# Patient Record
Sex: FEMALE | Race: WHITE | NOT HISPANIC OR LATINO | Employment: OTHER | URBAN - METROPOLITAN AREA
[De-identification: names, ages, dates, MRNs, and addresses within clinical notes are randomized per-mention and may not be internally consistent; named-entity substitution may affect disease eponyms.]

---

## 2017-07-27 ENCOUNTER — RADIANT APPOINTMENT (OUTPATIENT)
Dept: MAMMOGRAPHY | Facility: CLINIC | Age: 47
End: 2017-07-27
Payer: COMMERCIAL

## 2017-07-27 ENCOUNTER — OFFICE VISIT (OUTPATIENT)
Dept: OBGYN | Facility: CLINIC | Age: 47
End: 2017-07-27
Payer: COMMERCIAL

## 2017-07-27 VITALS
DIASTOLIC BLOOD PRESSURE: 78 MMHG | WEIGHT: 185 LBS | BODY MASS INDEX: 29.73 KG/M2 | SYSTOLIC BLOOD PRESSURE: 110 MMHG | HEIGHT: 66 IN

## 2017-07-27 DIAGNOSIS — Z01.419 ENCOUNTER FOR GYNECOLOGICAL EXAMINATION WITHOUT ABNORMAL FINDING: Primary | ICD-10-CM

## 2017-07-27 DIAGNOSIS — B00.9 HSV INFECTION: ICD-10-CM

## 2017-07-27 DIAGNOSIS — Z12.31 ENCOUNTER FOR SCREENING MAMMOGRAM FOR HIGH-RISK PATIENT: ICD-10-CM

## 2017-07-27 DIAGNOSIS — Z11.51 SCREENING FOR HUMAN PAPILLOMAVIRUS: ICD-10-CM

## 2017-07-27 PROCEDURE — 87624 HPV HI-RISK TYP POOLED RSLT: CPT | Performed by: NURSE PRACTITIONER

## 2017-07-27 PROCEDURE — 99396 PREV VISIT EST AGE 40-64: CPT | Performed by: NURSE PRACTITIONER

## 2017-07-27 PROCEDURE — G0202 SCR MAMMO BI INCL CAD: HCPCS | Mod: TC

## 2017-07-27 PROCEDURE — G0145 SCR C/V CYTO,THINLAYER,RESCR: HCPCS | Performed by: NURSE PRACTITIONER

## 2017-07-27 RX ORDER — ACYCLOVIR 400 MG/1
400 TABLET ORAL 2 TIMES DAILY
Qty: 30 TABLET | Refills: 3 | Status: SHIPPED | OUTPATIENT
Start: 2017-07-27 | End: 2018-12-06

## 2017-07-27 ASSESSMENT — PATIENT HEALTH QUESTIONNAIRE - PHQ9: 5. POOR APPETITE OR OVEREATING: NOT AT ALL

## 2017-07-27 ASSESSMENT — ANXIETY QUESTIONNAIRES
GAD7 TOTAL SCORE: 0
5. BEING SO RESTLESS THAT IT IS HARD TO SIT STILL: NOT AT ALL
6. BECOMING EASILY ANNOYED OR IRRITABLE: NOT AT ALL
3. WORRYING TOO MUCH ABOUT DIFFERENT THINGS: NOT AT ALL
IF YOU CHECKED OFF ANY PROBLEMS ON THIS QUESTIONNAIRE, HOW DIFFICULT HAVE THESE PROBLEMS MADE IT FOR YOU TO DO YOUR WORK, TAKE CARE OF THINGS AT HOME, OR GET ALONG WITH OTHER PEOPLE: NOT DIFFICULT AT ALL
7. FEELING AFRAID AS IF SOMETHING AWFUL MIGHT HAPPEN: NOT AT ALL
1. FEELING NERVOUS, ANXIOUS, OR ON EDGE: NOT AT ALL
2. NOT BEING ABLE TO STOP OR CONTROL WORRYING: NOT AT ALL

## 2017-07-27 NOTE — MR AVS SNAPSHOT
"              After Visit Summary   2017    Allyssa Jimenez    MRN: [id number]           Patient Information     Date Of Birth          1970        Visit Information        Provider Department      2017 11:30 AM Lidia King APRN CNP [facility] [place]        Today's Diagnoses     Encounter for gynecological examination without abnormal finding    -  1    HSV infection           Follow-ups after your visit        Follow-up notes from your care team     Return in about 1 year (around 2018).      Who to contact     If you have questions or need follow up information about today's clinic visit or your schedule please contact [facility] directly at [phone].  Normal or non-critical lab and imaging results will be communicated to you by MyChart, letter or phone within 4 business days after the clinic has received the results. If you do not hear from us within 7 days, please contact the clinic through MyChart or phone. If you have a critical or abnormal lab result, we will notify you by phone as soon as possible.  Submit refill requests through Eons or call your pharmacy and they will forward the refill request to us. Please allow 3 business days for your refill to be completed.          Additional Information About Your Visit        MyChart Information     Eons lets you send messages to your doctor, view your test results, renew your prescriptions, schedule appointments and more. To sign up, go to [email or web address] . Click on \"Log in\" on the left side of the screen, which will take you to the Welcome page. Then click on \"Sign up Now\" on the right side of the page.     You will be asked to enter the access code listed below, as well as some personal information. Please follow the directions to create your username and password.     Your access code is: [email or web address]  Expires: 10/25/2017 11:56 AM     Your access code will  in 90 days. If you " "need help or a new code, please call your [facility] or [phone].        Care EveryWhere ID     This is your Care EveryWhere ID. This could be used by other organizations to access your [place] medical records  [id number]        Your Vitals Were     Height BMI (Body Mass Index)                5' 5.75\" (1.67 m) 30.09 kg/m2           Blood Pressure from Last 3 Encounters:   07/27/17 110/78   06/29/16 112/62   01/20/16 112/64    Weight from Last 3 Encounters:   07/27/17 185 lb (83.9 kg)   06/29/16 185 lb (83.9 kg)   01/20/16 190 lb (86.2 kg)              We Performed the Following     Pap imaged thin layer screen reflex to HPV if ASCUS - recommended age 25 - 29 years          Today's Medication Changes          These changes are accurate as of: 7/27/17 11:56 AM.  If you have any questions, ask your nurse or doctor.               These medicines have changed or have updated prescriptions.        Dose/Directions    acyclovir 400 MG tablet   Commonly known as:  ZOVIRAX   This may have changed:  See the new instructions.   Used for:  HSV infection   Changed by:  Lidia King APRN CNP        Dose:  400 mg   Take 1 tablet (400 mg) by mouth 2 times daily For 5 days when needed during an outbreak   Quantity:  30 tablet   Refills:  3            Where to get your medicines      [facility] [place], [place] - [address] [facility]  [address]  [facility] [place] [address]     Phone:  [phone]     acyclovir 400 MG tablet                Primary Care Provider Office Phone # Fax #    Dolores Raphael PA-C [phone]       [facility] [address] [address]        Equal Access to Services     [facility] STEPHANIE AH: Colin Vargas, waaxda luqadaha, qaybta kaalmada adeegyabrittany, celena tyson. So [facility] [phone].    ATENCIÓN: Si habla español, tiene a heredia disposición servicios gratuitos de " asistencia lingüística. Amberly al [phone].    We comply with applicable federal civil rights laws and [place] laws. We do not discriminate on the basis of race, color, national origin, age, disability sex, sexual orientation or gender identity.            Thank you!     Thank you for choosing [facility] [place]  for your care. Our goal is always to provide you with excellent care. Hearing back from our patients is one way we can continue to improve our services. Please take a few minutes to complete the written survey that you may receive in the mail after your visit with us. Thank you!             Your Updated Medication List - Protect others around you: Learn how to safely use, store and throw away your medicines at www.disposemymeds.org.          This list is accurate as of: 7/27/17 11:56 AM.  Always use your most recent med list.                   Brand Name Dispense Instructions for use Diagnosis    acyclovir 400 MG tablet    ZOVIRAX    30 tablet    Take 1 tablet (400 mg) by mouth 2 times daily For 5 days when needed during an outbreak    HSV infection       Multi-vitamin Tabs tablet      Take 1 tablet by mouth daily        OMEGA-3 FISH OIL PO      Take 2 g by mouth daily        VITAMIN D3 PO      Take 2,000 Units by mouth daily        ZYRTEC-D PO      Take 1 tablet by mouth daily

## 2017-07-27 NOTE — LETTER
August 8, 2017    Allyssa POLLARD Aafedt  [address] [place] [address]    Dear Allyssa,  We are happy to inform you that your PAP smear result from 7/27/17 is normal.  We are now able to do a follow up test on PAP smears. The DNA test is for HPV (Human Papilloma Virus). Cervical cancer is closely linked with certain types of HPV. Your result showed no evidence of high risk HPV.  Therefore we recommend you return in 1 year for your next pap smear and HPV test.  You will still need to return to the clinic every year for an annual exam and other preventive tests.  Please contact the clinic at [phone] with any questions.  Sincerely,    Lidia King, LETTY CNP/rlm

## 2017-07-27 NOTE — PROGRESS NOTES
Allyssa is a 47 year old  female who presents for annual exam.     Besides routine health maintenance, she has no other health concerns today .    HPI:  The patient's PCP is Dolores Raphael PA-C. Pt here for her annual exam. She is feeling well. She has problems with her achilles tendon and is planning on having an injection soon. Reports hot flashes/night sweats. No vaginal bleeding.     She has a small rash on her upper right scapula that is itchy. She had poison ivy over .      GYNECOLOGIC HISTORY:    No LMP recorded. Patient has had an ablation.  Her current contraception method is: none.  She  reports that she has never smoked. She has never used smokeless tobacco.      Patient is sexually active.  STD testing offered?  Declined  Last PHQ-9 score on record =   PHQ-9 SCORE 2017   Total Score 0     Last GAD7 score on record =   ESTELLE-7 SCORE 2017   Total Score 0     Alcohol Score = 5    HEALTH MAINTENANCE:  Cholesterol: Done at PCP  Last Mammo: 16, Result: normal, Next Mammo: today   Pap: 16 WNL  Lab Results   Component Value Date    PAP NIL 2016    PAP NIL 2016    PAP ASC-US 2015      Colonoscopy:  N/A, Result: not applicable  Dexa: Never    Health maintenance updated:  yes    HISTORY:  Obstetric History       T0      L2     SAB1   TAB0   Ectopic0   Multiple0   Live Births0       # Outcome Date GA Lbr Alejandro/2nd Weight Sex Delivery Anes PTL Lv   3 TAB            2 Para            1 Para                   Patient Active Problem List   Diagnosis     Sprain and strain of other specified sites of hip and thigh     Late effect of sprain and strain without mention of tendon injury     Nonallopathic lesion of sacral region     Ankle pain     Edema     Hamstring muscle strain     Pain in shoulder     Shoulder impingement syndrome     Past Surgical History:   Procedure Laterality Date     BUNIONECTOMY  2012     GYN SURGERY  2012    Novasure ablation     SHOULDER  "SURGERY Bilateral 2015    bicep tear      Social History   Substance Use Topics     Smoking status: Never Smoker     Smokeless tobacco: Never Used     Alcohol use 2.5 oz/week     5 Standard drinks or equivalent per week      Problem (# of Occurrences) Relation (Name,Age of Onset)    DIABETES (1) Mother    Heart Surgery (1) Father            Current Outpatient Prescriptions   Medication Sig     acyclovir (ZOVIRAX) 400 MG tablet Take 1 tablet (400 mg) by mouth 2 times daily For 5 days when needed during an outbreak     Cetirizine-Pseudoephedrine (ZYRTEC-D PO) Take 1 tablet by mouth daily     multivitamin, therapeutic with minerals (MULTI-VITAMIN) TABS Take 1 tablet by mouth daily     Omega-3 Fatty Acids (OMEGA-3 FISH OIL PO) Take 2 g by mouth daily     Cholecalciferol (VITAMIN D3 PO) Take 2,000 Units by mouth daily     [DISCONTINUED] acyclovir (ZOVIRAX) 400 MG tablet TK 1 T PO  TID FOR 5 DAYS     No current facility-administered medications for this visit.      No Known Allergies    Past medical, surgical, social and family histories were reviewed and updated in EPIC.    ROS:   12 point review of systems negative other than symptoms noted below.  Skin: Rash    EXAM:  /78  Ht 5' 5.75\" (1.67 m)  Wt 185 lb (83.9 kg)  BMI 30.09 kg/m2   BMI: Body mass index is 30.09 kg/(m^2).    PHYSICAL EXAM:  Constitutional:  Appearance: Well nourished, well developed, alert, in no acute distress  Neck:  Lymph Nodes:  No lymphadenopathy present    Thyroid:  Gland size normal, nontender, no nodules or masses present  on palpation  Chest:  Respiratory Effort:  Breathing unlabored  Cardiovascular:    Heart: Auscultation:  Regular rate, normal rhythm, no murmurs present  Breasts: Inspection of Breasts:  No lymphadenopathy present    Palpation of Breasts and Axillae:  No masses present on palpation, no  breast tenderness    Axillary Lymph Nodes:  No lymphadenopathy present  Gastrointestinal:   Abdominal Examination:  Abdomen " nontender to palpation, tone normal without rigidity or guarding, no masses present, umbilicus without lesions   Liver and Spleen:  No hepatomegaly present, liver nontender to palpation    Hernias:  No hernias present  Lymphatic: Lymph Nodes:  No other lymphadenopathy present  Skin:  General Inspection:  No rashes present, no lesions present, no areas of  discoloration    Genitalia and Groin:  No rashes present, no lesions present, no areas of  discoloration, no masses present  Neurologic/Psychiatric:    Mental Status:  Oriented X3     Pelvic Exam:  External Genitalia:     Normal appearance for age, no discharge present, no tenderness present, no inflammatory lesions present, color normal  Vagina:     Normal vaginal vault without central or paravaginal defects, no discharge present, no inflammatory lesions present, no masses present  Bladder:     Nontender to palpation  Urethra:   Urethral Body:  Urethra palpation normal, urethra structural support normal   Urethral Meatus:  No erythema or lesions present  Cervix:     Appearance healthy, no lesions present, nontender to palpation, no bleeding present  Uterus:     Uterus: firm, normal sized and nontender, anteverted in position.   Adnexa:     No adnexal tenderness present, no adnexal masses present  Perineum:     Perineum within normal limits, no evidence of trauma, no rashes or skin lesions present  Anus:     Anus within normal limits, no hemorrhoids present  Inguinal Lymph Nodes:     No lymphadenopathy present  Pubic Hair:     Normal pubic hair distribution for age  Genitalia and Groin:     No rashes present, no lesions present, no areas of discoloration, no masses present      COUNSELING:   Special attention given to:        Regular exercise       Healthy diet/nutrition       (Ieleen)menopause management    BMI: Body mass index is 30.09 kg/(m^2).  Weight management plan: Discussed healthy diet and exercise guidelines and patient will follow up in 12 months in clinic  to re-evaluate.    ASSESSMENT:  47 year old female with satisfactory annual exam.    ICD-10-CM    1. Encounter for gynecological examination without abnormal finding Z01.419 Pap imaged thin layer screen reflex to HPV if ASCUS - recommended age 25 - 29 years   2. HSV infection B00.9 acyclovir (ZOVIRAX) 400 MG tablet       PLAN:  Healthy, normal gyn exam. No concerns. Mammogram today. Refill acylovir.    LETTY Diego CNP

## 2017-07-28 ASSESSMENT — ANXIETY QUESTIONNAIRES: GAD7 TOTAL SCORE: 0

## 2017-07-28 ASSESSMENT — PATIENT HEALTH QUESTIONNAIRE - PHQ9: SUM OF ALL RESPONSES TO PHQ QUESTIONS 1-9: 0

## 2017-07-31 LAB
COPATH REPORT: NORMAL
PAP: NORMAL

## 2017-08-04 LAB
FINAL DIAGNOSIS: NORMAL
HPV HR 12 DNA CVX QL NAA+PROBE: NEGATIVE
HPV16 DNA SPEC QL NAA+PROBE: NEGATIVE
HPV18 DNA SPEC QL NAA+PROBE: NEGATIVE
SPECIMEN DESCRIPTION: NORMAL

## 2018-12-06 ENCOUNTER — RADIANT APPOINTMENT (OUTPATIENT)
Dept: MAMMOGRAPHY | Facility: CLINIC | Age: 48
End: 2018-12-06
Attending: NURSE PRACTITIONER
Payer: COMMERCIAL

## 2018-12-06 ENCOUNTER — OFFICE VISIT (OUTPATIENT)
Dept: OBGYN | Facility: CLINIC | Age: 48
End: 2018-12-06
Attending: NURSE PRACTITIONER
Payer: COMMERCIAL

## 2018-12-06 VITALS
BODY MASS INDEX: 30.79 KG/M2 | HEART RATE: 102 BPM | WEIGHT: 191.6 LBS | SYSTOLIC BLOOD PRESSURE: 112 MMHG | DIASTOLIC BLOOD PRESSURE: 72 MMHG | HEIGHT: 66 IN

## 2018-12-06 DIAGNOSIS — Z12.31 VISIT FOR SCREENING MAMMOGRAM: ICD-10-CM

## 2018-12-06 DIAGNOSIS — Z01.419 ENCOUNTER FOR GYNECOLOGICAL EXAMINATION WITHOUT ABNORMAL FINDING: Primary | ICD-10-CM

## 2018-12-06 DIAGNOSIS — B00.9 HSV INFECTION: ICD-10-CM

## 2018-12-06 PROCEDURE — 99396 PREV VISIT EST AGE 40-64: CPT | Performed by: NURSE PRACTITIONER

## 2018-12-06 PROCEDURE — 77067 SCR MAMMO BI INCL CAD: CPT | Mod: TC

## 2018-12-06 RX ORDER — LISINOPRIL 10 MG/1
10 TABLET ORAL
COMMUNITY
Start: 2018-11-29 | End: 2020-12-16

## 2018-12-06 RX ORDER — LORAZEPAM 0.5 MG/1
0.5 TABLET ORAL
COMMUNITY
Start: 2018-11-29 | End: 2020-12-16

## 2018-12-06 RX ORDER — NICOTINE POLACRILEX 4 MG/1
20 GUM, CHEWING ORAL
COMMUNITY
Start: 2018-11-29 | End: 2020-12-16

## 2018-12-06 RX ORDER — ACYCLOVIR 400 MG/1
400 TABLET ORAL 2 TIMES DAILY
Qty: 30 TABLET | Refills: 3 | Status: SHIPPED | OUTPATIENT
Start: 2018-12-06 | End: 2020-03-02

## 2018-12-06 ASSESSMENT — ANXIETY QUESTIONNAIRES
3. WORRYING TOO MUCH ABOUT DIFFERENT THINGS: NOT AT ALL
1. FEELING NERVOUS, ANXIOUS, OR ON EDGE: SEVERAL DAYS
IF YOU CHECKED OFF ANY PROBLEMS ON THIS QUESTIONNAIRE, HOW DIFFICULT HAVE THESE PROBLEMS MADE IT FOR YOU TO DO YOUR WORK, TAKE CARE OF THINGS AT HOME, OR GET ALONG WITH OTHER PEOPLE: NOT DIFFICULT AT ALL
5. BEING SO RESTLESS THAT IT IS HARD TO SIT STILL: NOT AT ALL
2. NOT BEING ABLE TO STOP OR CONTROL WORRYING: NOT AT ALL
6. BECOMING EASILY ANNOYED OR IRRITABLE: NOT AT ALL
7. FEELING AFRAID AS IF SOMETHING AWFUL MIGHT HAPPEN: NOT AT ALL
GAD7 TOTAL SCORE: 1

## 2018-12-06 ASSESSMENT — PATIENT HEALTH QUESTIONNAIRE - PHQ9
SUM OF ALL RESPONSES TO PHQ QUESTIONS 1-9: 0
5. POOR APPETITE OR OVEREATING: NOT AT ALL

## 2018-12-06 NOTE — MR AVS SNAPSHOT
"              After Visit Summary   2018    Allyssa Jimenez    MRN: [id number]           Patient Information     Date Of Birth          1970        Visit Information        Provider Department      2018 10:00 AM Lidia King APRN CNP [facility] [place]        Today's Diagnoses     Encounter for gynecological examination without abnormal finding    -  1    HSV infection           Follow-ups after your visit        Follow-up notes from your care team     Return in about 1 year (around 2019).      Who to contact     If you have questions or need follow up information about today's clinic visit or your schedule please contact [facility] directly at [phone].  Normal or non-critical lab and imaging results will be communicated to you by MyChart, letter or phone within 4 business days after the clinic has received the results. If you do not hear from us within 7 days, please contact the clinic through MyChart or phone. If you have a critical or abnormal lab result, we will notify you by phone as soon as possible.  Submit refill requests through Savorfull or call your pharmacy and they will forward the refill request to us. Please allow 3 business days for your refill to be completed.          Additional Information About Your Visit        MyChart Information     Savorfull lets you send messages to your doctor, view your test results, renew your prescriptions, schedule appointments and more. To sign up, go to [email or web address] . Click on \"Log in\" on the left side of the screen, which will take you to the Welcome page. Then click on \"Sign up Now\" on the right side of the page.     You will be asked to enter the access code listed below, as well as some personal information. Please follow the directions to create your username and password.     Your access code is: [email or web address]  Expires: 3/6/2019 10:07 AM     Your access code will  in 90 days. If you need " "help or a new code, please call your [place] clinic or [phone].        Care EveryWhere ID     This is your Care EveryWhere ID. This could be used by other organizations to access your [place] medical records  [id number]        Your Vitals Were     Pulse Height BMI (Body Mass Index)             102 5' 5.75\" (1.67 m) 31.16 kg/m2          Blood Pressure from Last 3 Encounters:   12/06/18 112/72   07/27/17 110/78   06/29/16 112/62    Weight from Last 3 Encounters:   12/06/18 191 lb 9.6 oz (86.9 kg)   07/27/17 185 lb (83.9 kg)   06/29/16 185 lb (83.9 kg)              Today, you had the following     No orders found for display         Where to get your medicines      These medications were sent to [facility] [id number]  [address] AT [facility] [address]  [place] [address]     Phone:  [phone]     acyclovir 400 MG tablet          Primary Care Provider Office Phone # Fax #    Dolores Raphael PA-C [phone]       [facility] [address] [address]        Equal Access to Services     AMARA BROWN AH: Hadii aad ku hadasho Soomaali, waaxda luqadaha, qaybta kaalmada adeegyada, celena zamorain hayaurora keys . So [facility] [phone].    ATENCIÓN: Si habla español, tiene a heredia disposición servicios gratuitos de asistencia lingüística. Llame al [phone].    We comply with applicable federal civil rights laws and [place] laws. We do not discriminate on the basis of race, color, national origin, age, disability, sex, sexual orientation, or gender identity.            Thank you!     Thank you for choosing [facility] [place]  for your care. Our goal is always to provide you with excellent care. Hearing back from our patients is one way we can continue to improve our services. Please take a few minutes to complete the written survey that you may receive in the mail after your visit with us. Thank you!             Your " Updated Medication List - Protect others around you: Learn how to safely use, store and throw away your medicines at www.disposemymeds.org.          This list is accurate as of 12/6/18 11:03 AM.  Always use your most recent med list.                   Brand Name Dispense Instructions for use Diagnosis    acyclovir 400 MG tablet    ZOVIRAX    30 tablet    Take 1 tablet (400 mg) by mouth 2 times daily For 5 days when needed during an outbreak    HSV infection       lisinopril 10 MG tablet    PRINIVIL/ZESTRIL     Take 10 mg by mouth        LORazepam 0.5 MG tablet    ATIVAN     Take 0.5 mg by mouth        Multi-vitamin tablet      Take 1 tablet by mouth daily        OMEGA-3 FISH OIL PO      Take 2 g by mouth daily        omeprazole 20 MG tablet      Take 20 mg by mouth        VITAMIN D3 PO      Take 2,000 Units by mouth daily        ZYRTEC-D PO      Take 1 tablet by mouth daily

## 2018-12-06 NOTE — PROGRESS NOTES
"  Allyssa is a 48 year old  female who presents for annual exam.     Besides routine health maintenance, she has no other health concerns today .    HPI:  The patient's PCP is Dolores Raphael PA-C.  Pt here today for her annual exam and mammogram. She is feeling much better after an \"episode\" of nausea and anxiety this past summer. She was dx with hypertension and reflux. She is now stable on lisinopril and is weaning off omeprazole. She does have lorazepam PRN but has only needed it twice.     She denies any vaginal bleeding. S/p ablation. She does have menopausal symptoms.     NIL pap in 2017 following a 2015 ASCUS pap.       GYNECOLOGIC HISTORY:    No LMP recorded. Patient has had an ablation.  Her current contraception method is: none.  She  reports that she has never smoked. She has never used smokeless tobacco.    Patient is sexually active.  STD testing offered?  Declined  Last PHQ-9 score on record =   PHQ-9 SCORE 2018   PHQ-9 Total Score 0     Last GAD7 score on record =   ESTELLE-7 SCORE 2018   Total Score 1     Alcohol Score = 4    HEALTH MAINTENANCE:  Cholesterol: (No results found for: CHOL patient has labs done with pcp  Last Mammo: 17, Result: normal, Next Mammo: today   Pap: 17 neg, HPV-  Colonoscopy: NA, due at age 51  Dexa: Never    Health maintenance updated:  yes    HISTORY:  Obstetric History       T0      L2     SAB0   TAB1   Ectopic0   Multiple0   Live Births0       # Outcome Date GA Lbr Alejandro/2nd Weight Sex Delivery Anes PTL Lv   3 TAB            2 Para            1 Para                   Patient Active Problem List   Diagnosis     Sprain and strain of other specified sites of hip and thigh     Late effect of sprain and strain without mention of tendon injury     Nonallopathic lesion of sacral region     Ankle pain     Edema     Hamstring muscle strain     Pain in shoulder     Shoulder impingement syndrome     ASCUS of cervix with negative high risk HPV     Past " "Surgical History:   Procedure Laterality Date     BUNIONECTOMY  2012     GYN SURGERY  2012    Novasure ablation     SHOULDER SURGERY Bilateral 2015    bicep tear      Social History   Substance Use Topics     Smoking status: Never Smoker     Smokeless tobacco: Never Used     Alcohol use 2.5 oz/week     5 Standard drinks or equivalent per week      Problem (# of Occurrences) Relation (Name,Age of Onset)    Diabetes (1) Mother    Heart Surgery (1) Father            Current Outpatient Prescriptions   Medication Sig     acyclovir (ZOVIRAX) 400 MG tablet Take 1 tablet (400 mg) by mouth 2 times daily For 5 days when needed during an outbreak     Cetirizine-Pseudoephedrine (ZYRTEC-D PO) Take 1 tablet by mouth daily     Cholecalciferol (VITAMIN D3 PO) Take 2,000 Units by mouth daily     lisinopril (PRINIVIL/ZESTRIL) 10 MG tablet Take 10 mg by mouth     LORazepam (ATIVAN) 0.5 MG tablet Take 0.5 mg by mouth     multivitamin, therapeutic with minerals (MULTI-VITAMIN) TABS Take 1 tablet by mouth daily     Omega-3 Fatty Acids (OMEGA-3 FISH OIL PO) Take 2 g by mouth daily     omeprazole 20 MG tablet Take 20 mg by mouth     [DISCONTINUED] acyclovir (ZOVIRAX) 400 MG tablet Take 1 tablet (400 mg) by mouth 2 times daily For 5 days when needed during an outbreak     No current facility-administered medications for this visit.      No Known Allergies    Past medical, surgical, social and family histories were reviewed and updated in EPIC.    ROS:   12 point review of systems negative other than symptoms noted below.  Constitutional: Weight Gain  Musculoskeletal: Joint Pain  Psychiatric: Anxiety    EXAM:  /72  Pulse 102  Ht 5' 5.75\" (1.67 m)  Wt 191 lb 9.6 oz (86.9 kg)  BMI 31.16 kg/m2   BMI: Body mass index is 31.16 kg/(m^2).    PHYSICAL EXAM:  Constitutional:  Appearance: Well nourished, well developed, alert, in no acute distress  Neck:  Lymph Nodes:  No lymphadenopathy present    Thyroid:  Gland size normal, nontender, no " nodules or masses present  on palpation  Chest:  Respiratory Effort:  Breathing unlabored  Cardiovascular:    Heart: Auscultation:  Regular rate, normal rhythm, no murmurs present  Breasts: Inspection of Breasts:  No lymphadenopathy present., Palpation of Breasts and Axillae:  No masses present on palpation, no breast tenderness., Axillary Lymph Nodes:  No lymphadenopathy present. and No nodularity, asymmetry or nipple discharge bilaterally.  Gastrointestinal:   Abdominal Examination:  Abdomen nontender to palpation, tone normal without rigidity or guarding, no masses present, umbilicus without lesions   Liver and Spleen:  No hepatomegaly present, liver nontender to palpation    Hernias:  No hernias present  Lymphatic: Lymph Nodes:  No other lymphadenopathy present  Skin:  General Inspection:  No rashes present, no lesions present, no areas of  discoloration    Genitalia and Groin:  No rashes present, no lesions present, no areas of  discoloration, no masses present  Neurologic/Psychiatric:    Mental Status:  Oriented X3     Pelvic Exam:  External Genitalia:     Normal appearance for age, no discharge present, no tenderness present, no inflammatory lesions present, color normal  Vagina:     Normal vaginal vault without central or paravaginal defects, no discharge present, no inflammatory lesions present, no masses present  Bladder:     Nontender to palpation  Urethra:   Urethral Body:  Urethra palpation normal, urethra structural support normal   Urethral Meatus:  No erythema or lesions present  Cervix:     Appearance healthy, no lesions present, nontender to palpation, no bleeding present  Uterus:     Uterus: firm, normal sized and nontender, anteverted in position.   Adnexa:     No adnexal tenderness present, no adnexal masses present  Perineum:     Perineum within normal limits, no evidence of trauma, no rashes or skin lesions present  Anus:     Anus within normal limits, no hemorrhoids present  Inguinal Lymph  Nodes:     No lymphadenopathy present  Pubic Hair:     Normal pubic hair distribution for age  Genitalia and Groin:     No rashes present, no lesions present, no areas of discoloration, no masses present      COUNSELING:   Special attention given to:        Regular exercise       Healthy diet/nutrition       Colon cancer screening       (Eileen)menopause management    BMI: Body mass index is 31.16 kg/(m^2).  Weight management plan: Discussed healthy diet and exercise guidelines    ASSESSMENT:  48 year old female with satisfactory annual exam.    ICD-10-CM    1. Encounter for gynecological examination without abnormal finding Z01.419    2. HSV infection B00.9 acyclovir (ZOVIRAX) 400 MG tablet       PLAN:  Overweight female with normal gyn exam. Pap guidelines discussed. Will pap every 3 years. No pap collected today. Refill of acyclovir if she needs it. Fasting labs per her primary.     LETTY Diego CNP

## 2018-12-07 ASSESSMENT — ANXIETY QUESTIONNAIRES: GAD7 TOTAL SCORE: 1

## 2019-03-13 ENCOUNTER — APPOINTMENT (OUTPATIENT)
Dept: GENERAL RADIOLOGY | Facility: CLINIC | Age: 49
End: 2019-03-13
Attending: EMERGENCY MEDICINE
Payer: COMMERCIAL

## 2019-03-13 ENCOUNTER — HOSPITAL ENCOUNTER (EMERGENCY)
Facility: CLINIC | Age: 49
Discharge: HOME | End: 2019-03-13
Attending: EMERGENCY MEDICINE | Admitting: EMERGENCY MEDICINE
Payer: COMMERCIAL

## 2019-03-13 VITALS
DIASTOLIC BLOOD PRESSURE: 80 MMHG | WEIGHT: 175 LBS | OXYGEN SATURATION: 98 % | SYSTOLIC BLOOD PRESSURE: 111 MMHG | BODY MASS INDEX: 28.12 KG/M2 | HEIGHT: 66 IN | RESPIRATION RATE: 18 BRPM | TEMPERATURE: 98.5 F | HEART RATE: 86 BPM

## 2019-03-13 DIAGNOSIS — R07.89 ATYPICAL CHEST PAIN: ICD-10-CM

## 2019-03-13 DIAGNOSIS — K21.00 GASTROESOPHAGEAL REFLUX DISEASE WITH ESOPHAGITIS: ICD-10-CM

## 2019-03-13 LAB
ANION GAP SERPL CALCULATED.3IONS-SCNC: 9 MMOL/L (ref 3–14)
BASOPHILS # BLD AUTO: 0 10E9/L (ref 0–0.2)
BASOPHILS NFR BLD AUTO: 0.6 %
BUN SERPL-MCNC: 10 MG/DL (ref 7–30)
CALCIUM SERPL-MCNC: 9.3 MG/DL (ref 8.5–10.1)
CHLORIDE SERPL-SCNC: 104 MMOL/L (ref 94–109)
CO2 SERPL-SCNC: 24 MMOL/L (ref 20–32)
CREAT SERPL-MCNC: 0.7 MG/DL (ref 0.52–1.04)
DIFFERENTIAL METHOD BLD: ABNORMAL
EOSINOPHIL # BLD AUTO: 0.1 10E9/L (ref 0–0.7)
EOSINOPHIL NFR BLD AUTO: 2.1 %
ERYTHROCYTE [DISTWIDTH] IN BLOOD BY AUTOMATED COUNT: 12.4 % (ref 10–15)
GFR SERPL CREATININE-BSD FRML MDRD: >90 ML/MIN/{1.73_M2}
GLUCOSE SERPL-MCNC: 105 MG/DL (ref 70–99)
HCT VFR BLD AUTO: 41.2 % (ref 35–47)
HGB BLD-MCNC: 14.4 G/DL (ref 11.7–15.7)
IMM GRANULOCYTES # BLD: 0 10E9/L (ref 0–0.4)
IMM GRANULOCYTES NFR BLD: 0.4 %
INTERPRETATION ECG - MUSE: NORMAL
LYMPHOCYTES # BLD AUTO: 1.3 10E9/L (ref 0.8–5.3)
LYMPHOCYTES NFR BLD AUTO: 26.1 %
MCH RBC QN AUTO: 33.3 PG (ref 26.5–33)
MCHC RBC AUTO-ENTMCNC: 35 G/DL (ref 31.5–36.5)
MCV RBC AUTO: 95 FL (ref 78–100)
MONOCYTES # BLD AUTO: 0.7 10E9/L (ref 0–1.3)
MONOCYTES NFR BLD AUTO: 13.8 %
NEUTROPHILS # BLD AUTO: 2.8 10E9/L (ref 1.6–8.3)
NEUTROPHILS NFR BLD AUTO: 57 %
NRBC # BLD AUTO: 0 10*3/UL
NRBC BLD AUTO-RTO: 0 /100
PLATELET # BLD AUTO: 248 10E9/L (ref 150–450)
POTASSIUM SERPL-SCNC: 4 MMOL/L (ref 3.4–5.3)
RBC # BLD AUTO: 4.33 10E12/L (ref 3.8–5.2)
SODIUM SERPL-SCNC: 137 MMOL/L (ref 133–144)
TROPONIN I SERPL-MCNC: <0.015 UG/L (ref 0–0.04)
TROPONIN I SERPL-MCNC: <0.015 UG/L (ref 0–0.04)
WBC # BLD AUTO: 4.9 10E9/L (ref 4–11)

## 2019-03-13 PROCEDURE — 25000132 ZZH RX MED GY IP 250 OP 250 PS 637: Performed by: EMERGENCY MEDICINE

## 2019-03-13 PROCEDURE — 71046 X-RAY EXAM CHEST 2 VIEWS: CPT

## 2019-03-13 PROCEDURE — 80048 BASIC METABOLIC PNL TOTAL CA: CPT | Performed by: EMERGENCY MEDICINE

## 2019-03-13 PROCEDURE — 85025 COMPLETE CBC W/AUTO DIFF WBC: CPT | Performed by: EMERGENCY MEDICINE

## 2019-03-13 PROCEDURE — 84484 ASSAY OF TROPONIN QUANT: CPT | Performed by: EMERGENCY MEDICINE

## 2019-03-13 PROCEDURE — 99285 EMERGENCY DEPT VISIT HI MDM: CPT | Mod: 25

## 2019-03-13 PROCEDURE — 93005 ELECTROCARDIOGRAM TRACING: CPT

## 2019-03-13 PROCEDURE — 25000125 ZZHC RX 250: Performed by: EMERGENCY MEDICINE

## 2019-03-13 RX ADMIN — LIDOCAINE HYDROCHLORIDE 30 ML: 20 SOLUTION ORAL; TOPICAL at 10:23

## 2019-03-13 ASSESSMENT — ENCOUNTER SYMPTOMS
FEVER: 0
NECK PAIN: 1
SHORTNESS OF BREATH: 0
ABDOMINAL PAIN: 0
NERVOUS/ANXIOUS: 1

## 2019-03-13 ASSESSMENT — MIFFLIN-ST. JEOR: SCORE: 1440.54

## 2019-03-13 NOTE — ED AVS SNAPSHOT
Emergency Department  [address] [place] [address]  Phone:  [phone]  Fax:  [phone]                                    Allyssa Jimenez   MRN: [id number]    Department:   Emergency Department   Date of Visit:  3/13/2019           After Visit Summary Signature Page    I have received my discharge instructions, and my questions have been answered. I have discussed any challenges I see with this plan with the nurse or doctor.    ..........................................................................................................................................  Patient/Patient Representative Signature      ..........................................................................................................................................  Patient Representative Print Name and Relationship to Patient    ..................................................               ................................................  Date                                   Time    ..........................................................................................................................................  Reviewed by Signature/Title    ...................................................              ..............................................  Date                                               Time          22EPIC Rev 08/18

## 2019-03-13 NOTE — DISCHARGE INSTRUCTIONS
Discharge Instructions  Chest Pain    You have been seen today for chest pain or discomfort.  At this time, your doctor has found no signs that your chest pain is due to a serious or life-threatening condition, (or you have declined more testing and/or admission to the hospital). However, sometimes there is a serious problem that does not show up right away. Your evaluation today may not be complete and you may need further testing and evaluation.     You need to follow-up with your regular doctor within 3 days.    Return to the Emergency Department if:  Your chest pain changes, gets worse, starts to happen more often, or comes with less activity.  You are short of breath.  You get very weak or tired.  You pass out or faint.  You have any new symptoms, like fever, cough, numb legs, or you cough up blood.  You have anything else that worries you.    Until you follow-up with your regular doctor please do the following:  Take one aspirin daily unless you have an allergy or are told not to by your doctor.  If a stress test appointment has been made, go to the appointment.  If you have questions, contact your regular doctor.    If your doctor today has told you to follow-up with your regular doctor, it is very important that you make an appointment with your clinic and go to the appointment.  If you do not follow-up with your primary doctor, it may result in missing an important development which could result in permanent injury or disability and/or lasting pain.  If there is any problem keeping your appointment, call your doctor or return to the Emergency Department.    If you were given a prescription for medicine here today, be sure to read all of the information (including the package insert) that comes with your prescription.  This will include important information about the medicine, its side effects, and any warnings that you need to know about.  The pharmacist who fills the prescription can provide more  information and answer questions you may have about the medicine.  If you have questions or concerns that the pharmacist cannot address, please call or return to the Emergency Department.     Opioid Medication Information    Pain medications are among the most commonly prescribed medicines, so we are including this information for all our patients. If you did not receive pain medication or get a prescription for pain medicine, you can ignore it.     You may have been given a prescription for an opioid (narcotic) pain medicine and/or have received a pain medicine while here in the Emergency Department. These medicines can make you drowsy or impaired. You must not drive, operate dangerous equipment, or engage in any other dangerous activities while taking these medications. If you drive while taking these medications, you could be arrested for DUI, or driving under the influence. Do not drink any alcohol while you are taking these medications.     Opioid pain medications can cause addiction. If you have a history of chemical dependency of any type, you are at a higher risk of becoming addicted to pain medications.  Only take these prescribed medications to treat your pain when all other options have been tried. Take it for as short a time and as few doses as possible. Store your pain pills in a secure place, as they are frequently stolen and provide a dangerous opportunity for children or visitors in your house to start abusing these powerful medications. We will not replace any lost or stolen medicine.  As soon as your pain is better, you should flush all your remaining medication.     Many prescription pain medications contain Tylenol  (acetaminophen), including Vicodin , Tylenol #3 , Norco , Lortab , and Percocet .  You should not take any extra pills of Tylenol  if you are using these prescription medications or you can get very sick.  Do not ever take more than 3000 mg of acetaminophen in any 24 hour  period.    All opioids tend to cause constipation. Drink plenty of water and eat foods that have a lot of fiber, such as fruits, vegetables, prune juice, apple juice and high fiber cereal.  Take a laxative if you don?t move your bowels at least every other day. Miralax , Milk of Magnesia, Colace , or Senna  can be used to keep you regular.      Remember that you can always come back to the Emergency Department if you are not able to see your regular doctor in the amount of time listed above, if you get any new symptoms, or if there is anything that worries you.

## 2019-03-13 NOTE — ED PROVIDER NOTES
"  History     Chief Complaint:  Neck & Chest Tightness    HPI   Allyssa Jimenez is a 49 year old female who presents for evaluation of neck and chest tightness. Around 8 AM today, she reports a sudden onset of a \"weird sensation\" in her cheeks bilaterally and neck that radiated to her neck. She was cleaning dishes and not doing anything abnormal at that time. She did not feel any chest pain or dyspnea at that time, but she did feel warm similarly to a hot flash. This sensation subsided, but continued to recur every 3-4 minutes for the next half hour. The strangeness, along with recent diagnoses of hypertension and anxiety, prompted her to take her blood pressure which was elevated. Then, she called the nurse line who recommended she take her anxiety medication. When the sensation persisted despite the medication, she decided to present to the ED. Here, she reports one episode happening while here and that she can feel them coming on. She reports the chest sensation feels similar to her acid reflux, however she denies any abdominal pain, nor did she have breakfast or drink coffee this morning. She does report 2 days of influenza-like illness last week, but that has since resolved. She denies any increased life stress, other than these new diagnoses 6 months ago.     Cardiac/PE/DVT Risk Factors:  The patient has a history of hypertension. She does not smoke and denies any illicit drug use. She reports a family history of heart disease in her father; this was diagnosed after the age of 60. The patient denies any personal or familial history of PE, DVT, or clotting disorder. The patient reports no recent travel, surgery, or other immobilizations. She also reports regular exercise, including pilates and Peloton cycling classes. Last night, she cycled for 35 minutes with out any chest pain or other symptoms. She has never had a formal stress test.     Allergies:  NKDA    Medications:  " "  Acyclovir  Zyrtec-D  Lisinopril  Lorazepam  Prilosec    Past Medical History:    Acid reflux  HTN  Anxiety    Past Surgical History:    Bunionectomy  Novasure ablation  Bilateral shoulder surgery    Family History:    Diabetes  CAD    Social History:  Marital Status:   [2]  Negative for tobacco use.  Positive for alcohol use.      Review of Systems   Constitutional: Negative for fever.   Respiratory: Negative for shortness of breath.    Cardiovascular: Positive for chest pain.   Gastrointestinal: Negative for abdominal pain.   Musculoskeletal: Positive for neck pain.   Psychiatric/Behavioral: The patient is nervous/anxious.    All other systems reviewed and are negative.        Physical Exam     Patient Vitals for the past 24 hrs:   BP Temp Temp src Pulse Heart Rate Resp SpO2 Height Weight   03/13/19 1330 111/80 -- -- 86 -- -- -- -- --   03/13/19 1300 113/63 -- -- 81 -- -- -- -- --   03/13/19 1255 -- -- -- -- -- -- 98 % -- --   03/13/19 1155 114/77 -- -- 84 -- -- 99 % -- --   03/13/19 1112 115/81 -- -- -- 93 18 96 % -- --   03/13/19 0856 132/85 98.5  F (36.9  C) Oral 95 -- 16 97 % 1.676 m (5' 6\") 79.4 kg (175 lb)     Physical Exam  GENERAL: well developed, pleasant  HEAD: atraumatic  EYES: pupils reactive, extraocular muscles intact, conjunctivae normal  ENT:  mucus membranes moist  NECK:  trachea midline, normal range of motion  RESPIRATORY: no tachypnea, breath sounds clear to auscultation   CVS: normal S1/S2, no murmurs, intact distal pulses  ABDOMEN: soft, nontender, nondistention  MUSCULOSKELETAL: no deformities  SKIN: warm and dry, no acute rashes or ulceration  NEURO: GCS 15, cranial nerves intact, alert and oriented x3  PSYCH:  Mood/affect normal    Emergency Department Course   ECG:  Indication: Chest Discomfort  Time: 0851  Vent. Rate 87 bpm. TX interval 134. QRS duration 90. QT/QTc 396/476. P-R-T axis 54 49 38.    Normal sinus rhythm.   Low voltage QRS.  Cannot rule out anteroseptal infarct, age " undetermined.  Read time: 1000.    Imaging:  Radiographic findings were communicated with the patient who voiced understanding of the findings.  XR Chest 2 views:   No definite acute abnormality, as per radiology.      Laboratory:  CBC: WBC: 4.9, HGB: 14.4, PLT: 248   BMP: Glucose 105 (H), o/w WNL (Creatinine: 0.70)    1019 Troponin: <0.015   1302 Troponin: <0.015     Interventions:  1023 GI Cocktail (Maalox/Mylanta and viscous Lidocaine), 30 mL suspension, PO     Emergency Department Course:  Nursing notes and vitals reviewed. (6683) I performed an exam of the patient as documented above.      IV inserted. Medicine administered as documented above. Blood drawn. This was sent to the lab for further testing, results above.     The patient was sent for a chest x-ray while in the emergency department, findings above.      EKG obtained in the ED, see results above.      (2821) I rechecked the patient and discussed the results of her workup thus far.      Findings and plan explained to the Patient. Patient discharged home with instructions regarding supportive care, medications, and reasons to return. The importance of close follow-up was reviewed. I ordered a stress test for the patient to complete as an outpatient.      I personally reviewed the laboratory and imaging results with the Patient and answered all related questions prior to discharge.        Impression & Plan      Medical Decision Making:  Allyssa Jimenez is a 49 year old female patient presents with history of GERD and some jaw and neck pain while standing at the counter today.  Certainly acute coronary syndrome is considered versus GERD.  Patient notes that she works out on a regular basis and worked out last night on a [employer] bike without any exertional symptoms.  EKG troponin x2 is normal.  We will get her set up for an outpatient stress test.  She not having any ongoing symptoms.    Critical Care time:  none    Diagnosis:    ICD-10-CM    1. Atypical  chest pain R07.89 Echo Stress Echocardiogram   2. Gastroesophageal reflux disease with esophagitis K21.0 Echo Stress Echocardiogram       Disposition:  discharged to home    Discharge Medications:  There were no discharge medications.     Scribe Disclosure:  I, Krys Browne, am serving as a scribe on 3/13/2019 at 9:53 AM to personally document services performed by Christain Cowan MD based on my observations and the provider's statements to me.       Krys Browne  3/13/2019    EMERGENCY DEPARTMENT       Christian Cowan MD  03/14/19 2006

## 2019-12-11 ENCOUNTER — OFFICE VISIT (OUTPATIENT)
Dept: OBGYN | Facility: CLINIC | Age: 49
End: 2019-12-11
Payer: COMMERCIAL

## 2019-12-11 VITALS
SYSTOLIC BLOOD PRESSURE: 120 MMHG | WEIGHT: 189.8 LBS | HEIGHT: 66 IN | HEART RATE: 76 BPM | BODY MASS INDEX: 30.5 KG/M2 | DIASTOLIC BLOOD PRESSURE: 80 MMHG

## 2019-12-11 DIAGNOSIS — F41.9 ANXIETY AND DEPRESSION: ICD-10-CM

## 2019-12-11 DIAGNOSIS — Z01.419 ENCOUNTER FOR GYNECOLOGICAL EXAMINATION WITHOUT ABNORMAL FINDING: Primary | ICD-10-CM

## 2019-12-11 DIAGNOSIS — F32.A ANXIETY AND DEPRESSION: ICD-10-CM

## 2019-12-11 DIAGNOSIS — N64.4 BREAST PAIN, LEFT: ICD-10-CM

## 2019-12-11 PROCEDURE — 99214 OFFICE O/P EST MOD 30 MIN: CPT | Mod: 25 | Performed by: NURSE PRACTITIONER

## 2019-12-11 PROCEDURE — 99396 PREV VISIT EST AGE 40-64: CPT | Performed by: NURSE PRACTITIONER

## 2019-12-11 RX ORDER — ESCITALOPRAM OXALATE 5 MG/1
5 TABLET ORAL DAILY
Qty: 90 TABLET | Refills: 3 | Status: SHIPPED | OUTPATIENT
Start: 2019-12-11 | End: 2020-12-16

## 2019-12-11 ASSESSMENT — PATIENT HEALTH QUESTIONNAIRE - PHQ9
5. POOR APPETITE OR OVEREATING: NOT AT ALL
SUM OF ALL RESPONSES TO PHQ QUESTIONS 1-9: 6

## 2019-12-11 ASSESSMENT — ANXIETY QUESTIONNAIRES
5. BEING SO RESTLESS THAT IT IS HARD TO SIT STILL: NOT AT ALL
1. FEELING NERVOUS, ANXIOUS, OR ON EDGE: SEVERAL DAYS
GAD7 TOTAL SCORE: 2
6. BECOMING EASILY ANNOYED OR IRRITABLE: NOT AT ALL
2. NOT BEING ABLE TO STOP OR CONTROL WORRYING: SEVERAL DAYS
IF YOU CHECKED OFF ANY PROBLEMS ON THIS QUESTIONNAIRE, HOW DIFFICULT HAVE THESE PROBLEMS MADE IT FOR YOU TO DO YOUR WORK, TAKE CARE OF THINGS AT HOME, OR GET ALONG WITH OTHER PEOPLE: NOT DIFFICULT AT ALL
7. FEELING AFRAID AS IF SOMETHING AWFUL MIGHT HAPPEN: NOT AT ALL
3. WORRYING TOO MUCH ABOUT DIFFERENT THINGS: NOT AT ALL

## 2019-12-11 ASSESSMENT — MIFFLIN-ST. JEOR: SCORE: 1499.74

## 2019-12-11 NOTE — PROGRESS NOTES
Allyssa is a 49 year old  female who presents for annual exam.     Besides routine health maintenance, Left breast pain x 2 days    HPI:  The patient's PCP is  Dolores Raphael PA-C.  Pt here today for her gyn exam and mammogram. She has had new left sided breast pain that is very focal.     Her main concern is new/worse anxiety and depression. She has good day and very bad days. Has PRN lorazepam but doesn't use it as it makes her sleepy. She feels she needs something for daily use. She feels this is a lot of perimenopausal symptoms and I would agree.       GYNECOLOGIC HISTORY:    No LMP recorded. Patient has had an ablation.    Her current contraception method is: none.  She  reports that she has never smoked. She has never used smokeless tobacco.    Patient is sexually active.  STD testing offered?  Declined  Last PHQ-9 score on record =   PHQ-9 SCORE 2019   PHQ-9 Total Score 6     Last GAD7 score on record =   ESTELLE-7 SCORE 2019   Total Score 2     Alcohol Score = 3    HEALTH MAINTENANCE:  Cholesterol: 2017   Total= 265, Triglycerides=93 HDL=70, EXA=504, TSH=2.99  Last Mammo: 2018, Result: Normal, Next Mammo: Today   Pap: UTD  Lab Results   Component Value Date    PAP NIL, HPV -  2017    PAP NIL 2016    PAP NIL 2016     Colonoscopy:  NA, Result: Not applicable, Next Colonoscopy: Age 51   Dexa:  NA    Health maintenance updated:  yes    HISTORY:  OB History    Para Term  AB Living   3 2 0 0 1 2   SAB TAB Ectopic Multiple Live Births   0 1 0 0 0      # Outcome Date GA Lbr Alejandro/2nd Weight Sex Delivery Anes PTL Lv   3 TAB            2 Para            1 Para                Patient Active Problem List   Diagnosis     Sprain and strain of other specified sites of hip and thigh     Late effect of sprain and strain without mention of tendon injury     Nonallopathic lesion of sacral region     Ankle pain     Edema     Hamstring muscle strain     Pain in shoulder      "Shoulder impingement syndrome     ASCUS of cervix with negative high risk HPV     Past Surgical History:   Procedure Laterality Date     BUNIONECTOMY  2012     GYN SURGERY  2012    Novasure ablation     SHOULDER SURGERY Bilateral 2015    bicep tear      Social History     Tobacco Use     Smoking status: Never Smoker     Smokeless tobacco: Never Used   Substance Use Topics     Alcohol use: Yes     Alcohol/week: 4.2 standard drinks     Types: 5 Standard drinks or equivalent per week      Problem (# of Occurrences) Relation (Name,Age of Onset)    Diabetes (1) Mother    Heart Surgery (1) Father    No Known Problems (6) Sister, Brother, Maternal Grandmother, Maternal Grandfather, Paternal Grandmother, Other            Current Outpatient Medications   Medication Sig     acyclovir (ZOVIRAX) 400 MG tablet Take 1 tablet (400 mg) by mouth 2 times daily For 5 days when needed during an outbreak     Cetirizine-Pseudoephedrine (ZYRTEC-D PO) Take 1 tablet by mouth daily     Cholecalciferol (VITAMIN D3 PO) Take 2,000 Units by mouth daily     escitalopram (LEXAPRO) 5 MG tablet Take 1 tablet (5 mg) by mouth daily     lisinopril (PRINIVIL/ZESTRIL) 10 MG tablet Take 10 mg by mouth     LORazepam (ATIVAN) 0.5 MG tablet Take 0.5 mg by mouth     multivitamin, therapeutic with minerals (MULTI-VITAMIN) TABS Take 1 tablet by mouth daily     Omega-3 Fatty Acids (OMEGA-3 FISH OIL PO) Take 2 g by mouth daily     omeprazole 20 MG tablet Take 20 mg by mouth     No current facility-administered medications for this visit.      No Known Allergies    Past medical, surgical, social and family histories were reviewed and updated in EPIC.    ROS:   12 point review of systems negative other than symptoms noted below or in the HPI.  Breast: Left breast pain  No urinary frequency or dysuria, bladder or kidney problems    EXAM:  /80   Pulse 76   Ht 1.664 m (5' 5.5\")   Wt 86.1 kg (189 lb 12.8 oz)   BMI 31.10 kg/m     BMI: Body mass index is 31.1 " kg/m .    PHYSICAL EXAM:  Constitutional:   Appearance: Well nourished, well developed, alert, in no acute distress  Neck:  Lymph Nodes:  No lymphadenopathy present    Thyroid:  Gland size normal, nontender, no nodules or masses present  on palpation  Chest:  Respiratory Effort:  Breathing unlabored  Cardiovascular:    Heart: Auscultation:  Regular rate, normal rhythm, no murmurs present  Breasts: Inspection of Breasts:  No lymphadenopathy present., Axillary Lymph Nodes:  No lymphadenopathy present., No nodularity, asymmetry or nipple discharge bilaterally. and right unremarkable. LEFT FOCAL TENDERNESS WITH 3MM NODULE AT 9:00  Gastrointestinal:   Abdominal Examination:  Abdomen nontender to palpation, tone normal without rigidity or guarding, no masses present, umbilicus without lesions   Liver and Spleen:  No hepatomegaly present, liver nontender to palpation    Hernias:  No hernias present  Lymphatic: Lymph Nodes:  No other lymphadenopathy present  Skin:  General Inspection:  No rashes present, no lesions present, no areas of  discoloration  Neurologic:    Mental Status:  Oriented X3.  Normal strength and tone, sensory exam                grossly normal, mentation intact and speech normal.    Psychiatric:   Mentation appears normal and affect normal/bright.         Pelvic Exam:  External Genitalia:     Normal appearance for age, no discharge present, no tenderness present, no inflammatory lesions present, color normal  Vagina:     Normal vaginal vault without central or paravaginal defects, no discharge present, no inflammatory lesions present, no masses present  Bladder:     Nontender to palpation  Urethra:   Urethral Body:  Urethra palpation normal, urethra structural support normal   Urethral Meatus:  No erythema or lesions present  Cervix:     Appearance healthy, no lesions present, nontender to palpation, no bleeding present  Uterus:     Uterus: firm, normal sized and nontender, midplane in position.    Adnexa:     No adnexal tenderness present, no adnexal masses present  Perineum:     Perineum within normal limits, no evidence of trauma, no rashes or skin lesions present  Anus:     Anus within normal limits, no hemorrhoids present  Inguinal Lymph Nodes:     No lymphadenopathy present  Pubic Hair:     Normal pubic hair distribution for age  Genitalia and Groin:     No rashes present, no lesions present, no areas of discoloration, no masses present      COUNSELING:   Special attention given to:        Regular exercise       Healthy diet/nutrition    BMI: Body mass index is 31.1 kg/m .  Weight management plan: Discussed healthy diet and exercise guidelines    ASSESSMENT:  49 year old female with satisfactory annual exam.    ICD-10-CM    1. Encounter for gynecological examination without abnormal finding Z01.419    2. Breast pain, left N64.4 MA Diagnostic Bilateral w/Win     US Breast Left Complete 4 Quadrants   3. Anxiety and depression F41.9 escitalopram (LEXAPRO) 5 MG tablet    F32.9        PLAN:  PAP due next year. Needs diag mammo with US. Will start low does lexapro and recheck netta and phq in 4-6 weeks. M/R/B discussed.    Lidia King, APRN CNP

## 2019-12-12 ASSESSMENT — ANXIETY QUESTIONNAIRES: GAD7 TOTAL SCORE: 2

## 2019-12-13 ENCOUNTER — HOSPITAL ENCOUNTER (OUTPATIENT)
Dept: MAMMOGRAPHY | Facility: CLINIC | Age: 49
Discharge: HOME | End: 2019-12-13
Attending: NURSE PRACTITIONER | Admitting: NURSE PRACTITIONER
Payer: COMMERCIAL

## 2019-12-13 ENCOUNTER — HOSPITAL ENCOUNTER (OUTPATIENT)
Dept: MAMMOGRAPHY | Facility: CLINIC | Age: 49
End: 2019-12-13
Attending: NURSE PRACTITIONER
Payer: COMMERCIAL

## 2019-12-13 DIAGNOSIS — N64.4 BREAST PAIN, LEFT: ICD-10-CM

## 2019-12-13 PROCEDURE — G0279 TOMOSYNTHESIS, MAMMO: HCPCS

## 2019-12-13 PROCEDURE — 76642 ULTRASOUND BREAST LIMITED: CPT | Mod: LT

## 2019-12-13 PROCEDURE — 77066 DX MAMMO INCL CAD BI: CPT

## 2020-03-02 DIAGNOSIS — B00.9 HSV INFECTION: ICD-10-CM

## 2020-03-02 RX ORDER — ACYCLOVIR 400 MG/1
TABLET ORAL
Qty: 30 TABLET | Refills: 3 | Status: SHIPPED | OUTPATIENT
Start: 2020-03-02 | End: 2020-12-16

## 2020-03-02 NOTE — TELEPHONE ENCOUNTER
"Requested Prescriptions   Pending Prescriptions Disp Refills     acyclovir (ZOVIRAX) 400 MG tablet [Pharmacy Med Name: ACYCLOVIR 400MG TABLETS] 30 tablet 3     Sig: TAKE 1 TABLET BY MOUTH TWICE DAILY FOR 5 DAYS AS NEEDED DURING AN OUTBREAK       Antivirals for Herpes Protocol Passed - 3/2/2020  7:28 AM        Passed - Patient is age 12 or older        Passed - Recent (12 mo) or future (30 days) visit within the authorizing provider's specialty     Patient has had an office visit with the authorizing provider or a provider within the authorizing providers department within the previous 12 mos or has a future within next 30 days. See \"Patient Info\" tab in inbasket, or \"Choose Columns\" in Meds & Orders section of the refill encounter.              Passed - Medication is active on med list        Passed - Normal serum creatinine on file in past 12 months     Recent Labs   Lab Test 03/13/19  1019   CR 0.70             Prescription approved per [facility] Refill Protocol.  Shayna Dick RN on 3/2/2020 at 7:39 AM    "

## 2020-04-18 ENCOUNTER — HOSPITAL ENCOUNTER (EMERGENCY)
Facility: CLINIC | Age: 50
Discharge: HOME | End: 2020-04-18
Attending: FAMILY MEDICINE | Admitting: FAMILY MEDICINE
Payer: COMMERCIAL

## 2020-04-18 ENCOUNTER — APPOINTMENT (OUTPATIENT)
Dept: CT IMAGING | Facility: CLINIC | Age: 50
End: 2020-04-18
Attending: FAMILY MEDICINE
Payer: COMMERCIAL

## 2020-04-18 VITALS
RESPIRATION RATE: 16 BRPM | SYSTOLIC BLOOD PRESSURE: 116 MMHG | BODY MASS INDEX: 30.97 KG/M2 | OXYGEN SATURATION: 97 % | DIASTOLIC BLOOD PRESSURE: 75 MMHG | WEIGHT: 189 LBS | TEMPERATURE: 98.4 F

## 2020-04-18 DIAGNOSIS — K57.32 DIVERTICULITIS OF COLON: ICD-10-CM

## 2020-04-18 LAB
ALBUMIN SERPL-MCNC: 3.6 G/DL (ref 3.4–5)
ALBUMIN UR-MCNC: NEGATIVE MG/DL
ALP SERPL-CCNC: 109 U/L (ref 40–150)
ALT SERPL W P-5'-P-CCNC: 58 U/L (ref 0–50)
ANION GAP SERPL CALCULATED.3IONS-SCNC: 6 MMOL/L (ref 3–14)
APPEARANCE UR: CLEAR
AST SERPL W P-5'-P-CCNC: 33 U/L (ref 0–45)
BASOPHILS # BLD AUTO: 0.1 10E9/L (ref 0–0.2)
BASOPHILS NFR BLD AUTO: 0.9 %
BILIRUB SERPL-MCNC: 0.4 MG/DL (ref 0.2–1.3)
BILIRUB UR QL STRIP: NEGATIVE
BUN SERPL-MCNC: 12 MG/DL (ref 7–30)
CALCIUM SERPL-MCNC: 9.2 MG/DL (ref 8.5–10.1)
CHLORIDE SERPL-SCNC: 106 MMOL/L (ref 94–109)
CO2 SERPL-SCNC: 27 MMOL/L (ref 20–32)
COLOR UR AUTO: NORMAL
CREAT SERPL-MCNC: 0.84 MG/DL (ref 0.52–1.04)
DIFFERENTIAL METHOD BLD: NORMAL
EOSINOPHIL # BLD AUTO: 0.2 10E9/L (ref 0–0.7)
EOSINOPHIL NFR BLD AUTO: 2.1 %
ERYTHROCYTE [DISTWIDTH] IN BLOOD BY AUTOMATED COUNT: 12.3 % (ref 10–15)
GFR SERPL CREATININE-BSD FRML MDRD: 81 ML/MIN/{1.73_M2}
GLUCOSE SERPL-MCNC: 99 MG/DL (ref 70–99)
GLUCOSE UR STRIP-MCNC: NEGATIVE MG/DL
HCT VFR BLD AUTO: 38.8 % (ref 35–47)
HGB BLD-MCNC: 13 G/DL (ref 11.7–15.7)
HGB UR QL STRIP: NEGATIVE
IMM GRANULOCYTES # BLD: 0 10E9/L (ref 0–0.4)
IMM GRANULOCYTES NFR BLD: 0.4 %
KETONES UR STRIP-MCNC: NEGATIVE MG/DL
LEUKOCYTE ESTERASE UR QL STRIP: NEGATIVE
LIPASE SERPL-CCNC: 109 U/L (ref 73–393)
LYMPHOCYTES # BLD AUTO: 1.8 10E9/L (ref 0.8–5.3)
LYMPHOCYTES NFR BLD AUTO: 21.9 %
MCH RBC QN AUTO: 31.8 PG (ref 26.5–33)
MCHC RBC AUTO-ENTMCNC: 33.5 G/DL (ref 31.5–36.5)
MCV RBC AUTO: 95 FL (ref 78–100)
MONOCYTES # BLD AUTO: 0.8 10E9/L (ref 0–1.3)
MONOCYTES NFR BLD AUTO: 10.1 %
NEUTROPHILS # BLD AUTO: 5.3 10E9/L (ref 1.6–8.3)
NEUTROPHILS NFR BLD AUTO: 64.6 %
NITRATE UR QL: NEGATIVE
NRBC # BLD AUTO: 0 10*3/UL
NRBC BLD AUTO-RTO: 0 /100
PH UR STRIP: 6 PH (ref 5–7)
PLATELET # BLD AUTO: 320 10E9/L (ref 150–450)
POTASSIUM SERPL-SCNC: 3.6 MMOL/L (ref 3.4–5.3)
PROT SERPL-MCNC: 7.5 G/DL (ref 6.8–8.8)
RBC # BLD AUTO: 4.09 10E12/L (ref 3.8–5.2)
SODIUM SERPL-SCNC: 139 MMOL/L (ref 133–144)
SOURCE: NORMAL
SP GR UR STRIP: 1 (ref 1–1.03)
UROBILINOGEN UR STRIP-MCNC: 0 MG/DL (ref 0–2)
WBC # BLD AUTO: 8.2 10E9/L (ref 4–11)

## 2020-04-18 PROCEDURE — 25000132 ZZH RX MED GY IP 250 OP 250 PS 637: Performed by: FAMILY MEDICINE

## 2020-04-18 PROCEDURE — 83690 ASSAY OF LIPASE: CPT | Performed by: FAMILY MEDICINE

## 2020-04-18 PROCEDURE — 81003 URINALYSIS AUTO W/O SCOPE: CPT | Performed by: FAMILY MEDICINE

## 2020-04-18 PROCEDURE — 96374 THER/PROPH/DIAG INJ IV PUSH: CPT | Performed by: FAMILY MEDICINE

## 2020-04-18 PROCEDURE — 80053 COMPREHEN METABOLIC PANEL: CPT | Performed by: FAMILY MEDICINE

## 2020-04-18 PROCEDURE — 74176 CT ABD & PELVIS W/O CONTRAST: CPT

## 2020-04-18 PROCEDURE — 99284 EMERGENCY DEPT VISIT MOD MDM: CPT | Mod: Z6 | Performed by: FAMILY MEDICINE

## 2020-04-18 PROCEDURE — 85025 COMPLETE CBC W/AUTO DIFF WBC: CPT | Performed by: FAMILY MEDICINE

## 2020-04-18 PROCEDURE — 25000128 H RX IP 250 OP 636: Performed by: FAMILY MEDICINE

## 2020-04-18 PROCEDURE — 99285 EMERGENCY DEPT VISIT HI MDM: CPT | Mod: 25 | Performed by: FAMILY MEDICINE

## 2020-04-18 RX ORDER — KETOROLAC TROMETHAMINE 15 MG/ML
15 INJECTION, SOLUTION INTRAMUSCULAR; INTRAVENOUS ONCE
Status: COMPLETED | OUTPATIENT
Start: 2020-04-18 | End: 2020-04-18

## 2020-04-18 RX ADMIN — KETOROLAC TROMETHAMINE 15 MG: 15 INJECTION, SOLUTION INTRAMUSCULAR; INTRAVENOUS at 14:40

## 2020-04-18 RX ADMIN — AMOXICILLIN AND CLAVULANATE POTASSIUM 1 TABLET: 875; 125 TABLET, FILM COATED ORAL at 16:16

## 2020-04-18 NOTE — ED AVS SNAPSHOT
[facility] Emergency Department  [address] [address]  Phone:  [phone]  Fax:  [phone]                                    Allyssa Jimenez   MRN: [id number]    Department:  [facility] Emergency Department   Date of Visit:  4/18/2020           After Visit Summary Signature Page    I have received my discharge instructions, and my questions have been answered. I have discussed any challenges I see with this plan with the nurse or doctor.    ..........................................................................................................................................  Patient/Patient Representative Signature      ..........................................................................................................................................  Patient Representative Print Name and Relationship to Patient    ..................................................               ................................................  Date                                   Time    ..........................................................................................................................................  Reviewed by Signature/Title    ...................................................              ..............................................  Date                                               Time          22EPIC Rev 08/18

## 2020-04-18 NOTE — ED TRIAGE NOTES
Pt here with abdominal LLQ pain that started a couple days ago. Pt states that she has had diarrhea a couple times in the last couple days but has had normal bm's also.

## 2020-04-18 NOTE — ED PROVIDER NOTES
History     Chief Complaint   Patient presents with     Abdominal Pain     HPI  Allyssa Jimenez is a 50 year old female who presents with left flank and left lower quadrant abdominal pain.  Symptoms began 6 days ago with feeling bloated and then 3 days ago progressed to left mid quadrant pain.  The pain is described as colicky and comes sharp waves.  There is no associated nausea or anorexia.  She has not had a fever, sweats, chills.  She does not have dysuria or hematuria.  Her bowel movements have been normal.  She has had 2 prior C-sections but no other abdominal surgeries.  She has been at [facility] for 14 days for alcohol use disorder.  She denies recent sore throat, cough, myalgias, shortness of breath, chest pain.  She has no prior history of kidney stones or diverticulosis.  She is never had a colonoscopy.    Allergies:  No Known Allergies    Problem List:    Patient Active Problem List    Diagnosis Date Noted     ASCUS of cervix with negative high risk HPV 06/18/2015     Priority: Medium     6/18/15 ASCUS/Neg HPV  1/20/16 NIL/Neg HPV  6/29/16 NIL  7/27/17 NIL/Neg HPV.  Plan: cotest in 1 year to resolve ASCUS result       Pain in shoulder 06/03/2013     Priority: Medium     Shoulder impingement syndrome 06/03/2013     Priority: Medium     Hamstring muscle strain 02/16/2011     Priority: Medium     Ankle pain 12/16/2010     Priority: Medium     Edema 12/16/2010     Priority: Medium     Sprain and strain of other specified sites of hip and thigh 12/05/2007     Priority: Medium     Late effect of sprain and strain without mention of tendon injury 12/05/2007     Priority: Medium     Nonallopathic lesion of sacral region 12/05/2007     Priority: Medium     Problem list name updated by automated process. Provider to review          Past Medical History:    Past Medical History:   Diagnosis Date     ASCUS of cervix with negative high risk HPV 6/18/2015     Menorrhagia        Past Surgical History:     Past Surgical History:   Procedure Laterality Date     BUNIONECTOMY  2012     GYN SURGERY  2012    Novasure ablation     SHOULDER SURGERY Bilateral 2015    bicep tear       Family History:    Family History   Problem Relation Age of Onset     Diabetes Mother      Heart Surgery Father      No Known Problems Sister      No Known Problems Brother      No Known Problems Maternal Grandmother      No Known Problems Maternal Grandfather      No Known Problems Paternal Grandmother      No Known Problems Other        Social History:  Marital Status:   [2]  Social History     Tobacco Use     Smoking status: Never Smoker     Smokeless tobacco: Never Used   Substance Use Topics     Alcohol use: Not Currently     Alcohol/week: 4.2 standard drinks     Types: 5 Standard drinks or equivalent per week     Comment: quit on the 4th of April 2020     Drug use: No        Medications:    amoxicillin-clavulanate (AUGMENTIN) 875-125 MG tablet  acyclovir (ZOVIRAX) 400 MG tablet  Cetirizine-Pseudoephedrine (ZYRTEC-D PO)  Cholecalciferol (VITAMIN D3 PO)  escitalopram (LEXAPRO) 5 MG tablet  lisinopril (PRINIVIL/ZESTRIL) 10 MG tablet  LORazepam (ATIVAN) 0.5 MG tablet  multivitamin, therapeutic with minerals (MULTI-VITAMIN) TABS  Omega-3 Fatty Acids (OMEGA-3 FISH OIL PO)  omeprazole 20 MG tablet          Review of Systems    All other systems are reviewed and are negative    Physical Exam   BP: 115/71  Heart Rate: 95  Temp: 98.4  F (36.9  C)  Resp: 16  Weight: 85.7 kg (189 lb)  SpO2: 96 %      Physical Exam    Nursing note and vitals were reviewed.  Constitutional: Awake and alert, adequately nourished and developed appearing 50-year-old in moderate discomfort, who does not appear acutely ill, and who answers questions appropriately and cooperates with examination.  HEENT: EOMI.   Neck: Freely mobile.  Cardiovascular: Cardiac examination reveals normal heart rate and regular rhythm without murmur.  Pulmonary/Chest: Breathing is  unlabored.  Breath sounds are clear and equal bilaterally.  There no retractions, tachypnea, rales, wheezes, or rhonchi.  Abdomen: Soft, tender in the left lower quadrant with localized rebound but no guarding.  No tenderness elsewhere in the abdomen.  No referred tenderness.  She does have some left CVA tenderness to percussion.  None on the right..  Musculoskeletal: Extremities are warm and well-perfused and without edema  Neurological: Alert, oriented, thought content logical, coherent   Skin: Warm, dry, no rashes.  Psychiatric: Affect broad and appropriate.    ED Course        Procedures               Critical Care time:  none               Results for orders placed or performed during the hospital encounter of 04/18/20 (from the past 24 hour(s))   CBC with platelets differential   Result Value Ref Range    WBC 8.2 4.0 - 11.0 10e9/L    RBC Count 4.09 3.8 - 5.2 10e12/L    Hemoglobin 13.0 11.7 - 15.7 g/dL    Hematocrit 38.8 35.0 - 47.0 %    MCV 95 78 - 100 fl    MCH 31.8 26.5 - 33.0 pg    MCHC 33.5 31.5 - 36.5 g/dL    RDW 12.3 10.0 - 15.0 %    Platelet Count 320 150 - 450 10e9/L    Diff Method Automated Method     % Neutrophils 64.6 %    % Lymphocytes 21.9 %    % Monocytes 10.1 %    % Eosinophils 2.1 %    % Basophils 0.9 %    % Immature Granulocytes 0.4 %    Nucleated RBCs 0 0 /100    Absolute Neutrophil 5.3 1.6 - 8.3 10e9/L    Absolute Lymphocytes 1.8 0.8 - 5.3 10e9/L    Absolute Monocytes 0.8 0.0 - 1.3 10e9/L    Absolute Eosinophils 0.2 0.0 - 0.7 10e9/L    Absolute Basophils 0.1 0.0 - 0.2 10e9/L    Abs Immature Granulocytes 0.0 0 - 0.4 10e9/L    Absolute Nucleated RBC 0.0    Comprehensive metabolic panel   Result Value Ref Range    Sodium 139 133 - 144 mmol/L    Potassium 3.6 3.4 - 5.3 mmol/L    Chloride 106 94 - 109 mmol/L    Carbon Dioxide 27 20 - 32 mmol/L    Anion Gap 6 3 - 14 mmol/L    Glucose 99 70 - 99 mg/dL    Urea Nitrogen 12 7 - 30 mg/dL    Creatinine 0.84 0.52 - 1.04 mg/dL    GFR Estimate 81 >60  mL/min/[1.73_m2]    GFR Estimate If Black >90 >60 mL/min/[1.73_m2]    Calcium 9.2 8.5 - 10.1 mg/dL    Bilirubin Total 0.4 0.2 - 1.3 mg/dL    Albumin 3.6 3.4 - 5.0 g/dL    Protein Total 7.5 6.8 - 8.8 g/dL    Alkaline Phosphatase 109 40 - 150 U/L    ALT 58 (H) 0 - 50 U/L    AST 33 0 - 45 U/L   Lipase   Result Value Ref Range    Lipase 109 73 - 393 U/L   UA reflex to Microscopic   Result Value Ref Range    Color Urine Straw     Appearance Urine Clear     Glucose Urine Negative NEG^Negative mg/dL    Bilirubin Urine Negative NEG^Negative    Ketones Urine Negative NEG^Negative mg/dL    Specific Gravity Urine 1.004 1.003 - 1.035    Blood Urine Negative NEG^Negative    pH Urine 6.0 5.0 - 7.0 pH    Protein Albumin Urine Negative NEG^Negative mg/dL    Urobilinogen mg/dL 0.0 0.0 - 2.0 mg/dL    Nitrite Urine Negative NEG^Negative    Leukocyte Esterase Urine Negative NEG^Negative    Source Midstream Urine    CT Abdomen Pelvis w/o Contrast    Narrative    CT ABDOMEN AND PELVIS WITHOUT CONTRAST 4/18/2020 3:08 PM    CLINICAL HISTORY: Left flank and left lower quadrant abdominal pain.     TECHNIQUE: CT scan of the abdomen and pelvis was performed without IV  contrast. Multiplanar reformats were obtained. Dose reduction  techniques were used.    CONTRAST: None.    COMPARISON: None.    FINDINGS:   LOWER CHEST: Patchy scarring and nonspecific groundglass opacities at  the lung bases.    HEPATOBILIARY: Diffuse low-attenuation of the liver. Unremarkable  gallbladder.    PANCREAS: Normal.    SPLEEN: Normal.    ADRENAL GLANDS: Normal.    KIDNEYS/BLADDER: Normal.    BOWEL: The appendix is normal. No bowel obstruction. There is mild  abnormal fatty infiltration posterior to the distal descending colon  without evidence of perforation or abscess. No significant  diverticulosis of the colon.    LYMPH NODES: Normal.    VASCULATURE: Unremarkable.    PELVIC ORGANS: Normal.    OTHER: No ascites.    MUSCULOSKELETAL: Normal.      Impression     IMPRESSION:   1.  Mild fatty infiltration posterior to the distal descending colon  may be related to acute diverticulitis without evidence of perforation  or abscess. A focal colitis or a malignancy cannot be entirely  excluded.  2.  Fatty infiltration of the liver.    SONJA JAMES MD       Medications   amoxicillin-clavulanate (AUGMENTIN) 875-125 MG per tablet 1 tablet (has no administration in time range)   ketorolac (TORADOL) injection 15 mg (15 mg Intravenous Given 4/18/20 1440)       Assessments & Plan (with Medical Decision Making)     50-year-old female comes in from [facility] with left lower quadrant abdominal pain.  Differential diagnosis includes diverticulitis, renal colic, pyelonephritis, laboratory bowel disease, constipation, ovarian torsion, tubo-ovarian abscess, ovarian cyst.  Physical examination is suggestive of an inflammatory process in the left lower quadrant with some rebound tenderness is localized.  CT scan of the abdomen and pelvis shows mild fatty infiltration of the base of the descending colon posteriorly.  Most likely this represents diverticulitis.  Diverticulosis could not be seen but there is a family history of this.  No other cause for this was seen on CT and there was no evidence of hydronephrosis or perinephric stranding.  We will treat to cover for diverticulitis.  We discussed antibiotic options.  We have agreed on Augmentin twice daily.  She is advised to return if not improved in 48 hours or worse at any time.  She expressed understanding and she is comfortable with the plan and her questions were all answered.    I have reviewed the nursing notes.    I have reviewed the findings, diagnosis, plan and need for follow up with the patient.       New Prescriptions    AMOXICILLIN-CLAVULANATE (AUGMENTIN) 875-125 MG TABLET    Take 1 tablet by mouth 2 times daily for 10 days       Final diagnoses:   Diverticulitis of colon       4/18/2020   [facility]  EMERGENCY DEPARTMENT     Adan Lipscomb MD  04/18/20 [id number]

## 2020-04-18 NOTE — DISCHARGE INSTRUCTIONS
Take Augmentin 875 mg twice daily for 10 days.  Turn to be seen if not improved in 48 hours or if you develop fevers, nausea, vomiting, increased pain, or other new concerning symptoms.  Sometime in the next several months, after the symptoms resolved, be sure to schedule a colonoscopy to ensure no other cause for this inflammatory process seen on CT, which most likely represents diverticulitis.

## 2020-04-18 NOTE — ED NOTES
Pt reports abdomen pain that started 3 days that has progressively worsened, pt reports she noticed some bloating about 1 week ago. Pt reports having some diarrhea yesterday morning, but has had normal BM since.

## 2020-12-15 NOTE — PROGRESS NOTES
Allyssa is a 50 year old  female who presents for annual exam.     Besides routine health maintenance,  she would like to discuss diverticulitis.    HPI:  The patient's PCP is  Dolores Raphael PA-C.  Pt here today for her annual gyn exam.   Has mammogram and colonoscopy in January.   New dx of diverticulitis-has pretty consistent left upper and lower quadrant. No fever.   She is seeing a psychiatrist who is prescribing her lexapro for her. She sees them every 3 months. She was also seeing a therapist, but hasn't in a few months.   Using trazodone for sleep.  Recently had a left achilles tendon repair on 20 at [facility] in [place].       GYNECOLOGIC HISTORY:    No LMP recorded. Patient has had an ablation.    Regular menses? No, had an ablation      Her current contraception method is: none.  She  reports that she has never smoked. She has never used smokeless tobacco.    Patient is sexually active.  Last PHQ-9 score on record =   PHQ-9 SCORE 2020   PHQ-9 Total Score 4     Last GAD7 score on record =   ESTELLE-7 SCORE 2020   Total Score 2     Alcohol Score = 0    HEALTH MAINTENANCE:  Cholesterol:with PCP  Last Mammo: 19, Result: Normal, Next Mammo:  Scheduled for January  Pap:   Lab Results   Component Value Date    PAP NIL HPV- 2017    PAP NIL 2016    PAP NIL 2016      Colonoscopy:  never, Result: Not applicable, Next Colonoscopy: scheduled for january.  Dexa:  never    Health maintenance updated:  yes    HISTORY:  OB History    Para Term  AB Living   3 2 0 0 1 2   SAB TAB Ectopic Multiple Live Births   0 1 0 0 0      # Outcome Date GA Lbr Alejandro/2nd Weight Sex Delivery Anes PTL Lv   3 TAB            2 Para            1 Para                Patient Active Problem List   Diagnosis     Sprain and strain of other specified sites of hip and thigh     Late effect of sprain and strain without mention of tendon injury     Nonallopathic lesion of sacral region     Ankle pain      "Edema     Hamstring muscle strain     Pain in shoulder     Shoulder impingement syndrome     ASCUS of cervix with negative high risk HPV     Alcohol use disorder, moderate, dependence (H)     Depression, major, single episode, mild (H)     Past Surgical History:   Procedure Laterality Date     BUNIONECTOMY  2012     GYN SURGERY  2012    Novasure ablation     REPAIR TENDON ACHILLES Left 11/11/2020     SHOULDER SURGERY Bilateral 2015    bicep tear      Social History     Tobacco Use     Smoking status: Never Smoker     Smokeless tobacco: Never Used   Substance Use Topics     Alcohol use: Not Currently     Alcohol/week: 4.2 standard drinks     Types: 5 Standard drinks or equivalent per week     Comment: quit on the 4th of April 2020      Problem (# of Occurrences) Relation (Name,Age of Onset)    Diabetes (1) Mother    Heart Surgery (1) Father    No Known Problems (6) Sister, Brother, Maternal Grandmother, Maternal Grandfather, Paternal Grandmother, Other            Current Outpatient Medications   Medication Sig     acyclovir (ZOVIRAX) 400 MG tablet TAKE 1 TABLET BY MOUTH TWICE DAILY FOR 5 DAYS AS NEEDED DURING AN OUTBREAK     Cetirizine-Pseudoephedrine (ZYRTEC-D PO) Take 1 tablet by mouth daily     Cholecalciferol (VITAMIN D3 PO) Take 2,000 Units by mouth daily     escitalopram (LEXAPRO) 20 MG tablet Take 20 mg by mouth daily     traZODone (DESYREL) 50 MG tablet Take 50 mg by mouth At Bedtime     No current facility-administered medications for this visit.      No Known Allergies    Past medical, surgical, social and family histories were reviewed and updated in EPIC.    ROS:   12 point review of systems negative other than symptoms noted below or in the HPI.  Gastrointestinal: Abdominal Pain  No urinary frequency or dysuria, bladder or kidney problems    EXAM:  /68   Pulse 72   Ht 1.664 m (5' 5.5\")   Wt 81.6 kg (180 lb)   BMI 29.50 kg/m     BMI: Body mass index is 29.5 kg/m .    PHYSICAL " EXAM:  Constitutional:   Appearance: Well nourished, well developed, alert, in no acute distress  Neck:  Lymph Nodes:  No lymphadenopathy present    Thyroid:  Gland size normal, nontender, no nodules or masses present  on palpation  Chest:  Respiratory Effort:  Breathing unlabored  Cardiovascular:    Heart: Auscultation:  Regular rate, normal rhythm, no murmurs present  Breasts: Inspection of Breasts:  No lymphadenopathy present., Palpation of Breasts and Axillae:  No masses present on palpation, no breast tenderness., Axillary Lymph Nodes:  No lymphadenopathy present. and No nodularity, asymmetry or nipple discharge bilaterally.  Gastrointestinal:   Abdominal Examination:  Abdomen nontender to palpation, tone normal without rigidity or guarding, no masses present, umbilicus without lesions   Liver and Spleen:  No hepatomegaly present, liver nontender to palpation    Hernias:  No hernias present  Lymphatic: Lymph Nodes:  No other lymphadenopathy present  Skin:  General Inspection:  No rashes present, no lesions present, no areas of  discoloration  Neurologic:    Mental Status:  Oriented X3.  Normal strength and tone, sensory exam                grossly normal, mentation intact and speech normal.    Psychiatric:   Mentation appears normal and affect normal/bright.         Pelvic Exam:  External Genitalia:     Normal appearance for age, no discharge present, no tenderness present, no inflammatory lesions present, color normal  Vagina:     Normal vaginal vault without central or paravaginal defects, no discharge present, no inflammatory lesions present, no masses present  Bladder:     Nontender to palpation  Urethra:   Urethral Body:  Urethra palpation normal, urethra structural support normal   Urethral Meatus:  No erythema or lesions present  Cervix:     Appearance healthy, no lesions present, nontender to palpation, no bleeding present  Uterus:     Uterus: firm, normal sized and nontender, midplane in position.    Adnexa:     No adnexal tenderness present, no adnexal masses present  Perineum:     Perineum within normal limits, no evidence of trauma, no rashes or skin lesions present  Anus:     Anus within normal limits, no hemorrhoids present  Inguinal Lymph Nodes:     No lymphadenopathy present  Pubic Hair:     Normal pubic hair distribution for age  Genitalia and Groin:     No rashes present, no lesions present, no areas of discoloration, no masses present      COUNSELING:   Special attention given to:        Regular exercise       Healthy diet/nutrition       Colon cancer screening       (Eileen)menopause management    BMI: Body mass index is 29.5 kg/m .  Weight management plan: Discussed healthy diet and exercise guidelines    ASSESSMENT:  50 year old female with satisfactory annual exam.    ICD-10-CM    1. Encounter for gynecological examination without abnormal finding  Z01.419 HPV High Risk Types DNA Cervical     Pap imaged thin layer screen with HPV - recommended age 30 - 65 years (select HPV order below)   2. HSV infection  B00.9 acyclovir (ZOVIRAX) 400 MG tablet   3. Anxiety and depression  F41.9     F32.9        PLAN:  Pap updated. If NIL pap every 3 years. Ok to continue acyclovir    LETTY Diego CNP

## 2020-12-16 ENCOUNTER — OFFICE VISIT (OUTPATIENT)
Dept: OBGYN | Facility: CLINIC | Age: 50
End: 2020-12-16
Payer: COMMERCIAL

## 2020-12-16 VITALS
SYSTOLIC BLOOD PRESSURE: 110 MMHG | DIASTOLIC BLOOD PRESSURE: 68 MMHG | HEIGHT: 66 IN | HEART RATE: 72 BPM | BODY MASS INDEX: 28.93 KG/M2 | WEIGHT: 180 LBS

## 2020-12-16 DIAGNOSIS — Z01.419 ENCOUNTER FOR GYNECOLOGICAL EXAMINATION WITHOUT ABNORMAL FINDING: Primary | ICD-10-CM

## 2020-12-16 DIAGNOSIS — B00.9 HSV INFECTION: ICD-10-CM

## 2020-12-16 DIAGNOSIS — F41.9 ANXIETY AND DEPRESSION: ICD-10-CM

## 2020-12-16 DIAGNOSIS — F32.A ANXIETY AND DEPRESSION: ICD-10-CM

## 2020-12-16 PROBLEM — F32.0 DEPRESSION, MAJOR, SINGLE EPISODE, MILD (H): Status: ACTIVE | Noted: 2020-05-06

## 2020-12-16 PROBLEM — F10.20 ALCOHOL USE DISORDER, MODERATE, DEPENDENCE (H): Status: ACTIVE | Noted: 2020-05-06

## 2020-12-16 PROCEDURE — 99396 PREV VISIT EST AGE 40-64: CPT | Performed by: NURSE PRACTITIONER

## 2020-12-16 PROCEDURE — G0145 SCR C/V CYTO,THINLAYER,RESCR: HCPCS | Performed by: NURSE PRACTITIONER

## 2020-12-16 PROCEDURE — 87624 HPV HI-RISK TYP POOLED RSLT: CPT | Performed by: NURSE PRACTITIONER

## 2020-12-16 RX ORDER — ACYCLOVIR 400 MG/1
TABLET ORAL
Qty: 30 TABLET | Refills: 3 | Status: SHIPPED | OUTPATIENT
Start: 2020-12-16 | End: 2022-04-29

## 2020-12-16 RX ORDER — ESCITALOPRAM OXALATE 20 MG/1
20 TABLET ORAL DAILY
COMMUNITY

## 2020-12-16 RX ORDER — TRAZODONE HYDROCHLORIDE 50 MG/1
50 TABLET, FILM COATED ORAL AT BEDTIME
COMMUNITY

## 2020-12-16 ASSESSMENT — ANXIETY QUESTIONNAIRES
2. NOT BEING ABLE TO STOP OR CONTROL WORRYING: SEVERAL DAYS
3. WORRYING TOO MUCH ABOUT DIFFERENT THINGS: NOT AT ALL
5. BEING SO RESTLESS THAT IT IS HARD TO SIT STILL: NOT AT ALL
GAD7 TOTAL SCORE: 2
IF YOU CHECKED OFF ANY PROBLEMS ON THIS QUESTIONNAIRE, HOW DIFFICULT HAVE THESE PROBLEMS MADE IT FOR YOU TO DO YOUR WORK, TAKE CARE OF THINGS AT HOME, OR GET ALONG WITH OTHER PEOPLE: NOT DIFFICULT AT ALL
6. BECOMING EASILY ANNOYED OR IRRITABLE: NOT AT ALL
1. FEELING NERVOUS, ANXIOUS, OR ON EDGE: SEVERAL DAYS
7. FEELING AFRAID AS IF SOMETHING AWFUL MIGHT HAPPEN: NOT AT ALL

## 2020-12-16 ASSESSMENT — MIFFLIN-ST. JEOR: SCORE: 1450.28

## 2020-12-16 ASSESSMENT — PATIENT HEALTH QUESTIONNAIRE - PHQ9
5. POOR APPETITE OR OVEREATING: NOT AT ALL
SUM OF ALL RESPONSES TO PHQ QUESTIONS 1-9: 4

## 2020-12-17 ASSESSMENT — ANXIETY QUESTIONNAIRES: GAD7 TOTAL SCORE: 2

## 2020-12-18 LAB
COPATH REPORT: NORMAL
PAP: NORMAL

## 2020-12-22 LAB
FINAL DIAGNOSIS: NORMAL
HPV HR 12 DNA CVX QL NAA+PROBE: NEGATIVE
HPV16 DNA SPEC QL NAA+PROBE: NEGATIVE
HPV18 DNA SPEC QL NAA+PROBE: NEGATIVE
SPECIMEN DESCRIPTION: NORMAL
SPECIMEN SOURCE CVX/VAG CYTO: NORMAL

## 2021-01-15 ENCOUNTER — ANCILLARY PROCEDURE (OUTPATIENT)
Dept: MAMMOGRAPHY | Facility: CLINIC | Age: 51
End: 2021-01-15
Payer: COMMERCIAL

## 2021-01-15 DIAGNOSIS — Z12.31 VISIT FOR SCREENING MAMMOGRAM: ICD-10-CM

## 2021-01-15 PROCEDURE — 77067 SCR MAMMO BI INCL CAD: CPT | Mod: TC | Performed by: RADIOLOGY

## 2021-01-15 PROCEDURE — 77063 BREAST TOMOSYNTHESIS BI: CPT | Mod: TC | Performed by: RADIOLOGY

## 2021-01-25 ENCOUNTER — TRANSFERRED RECORDS (OUTPATIENT)
Dept: HEALTH INFORMATION MANAGEMENT | Facility: CLINIC | Age: 51
End: 2021-01-25

## 2022-04-28 NOTE — PROGRESS NOTES
Allyssa is a 52 year old  female who presents for annual exam.     Besides routine health maintenance, she has some LLQ pain.    HPI:  The patient's PCP is Dolores Raphael PA-C.  Patient here today for her annual GYN exam and mammogram.  She is up-to-date on her Pap smear.  She continues to have left quadrant pain but when asked she points more to the left upper quadrant of the abdomen.  She has been sober from alcohol for 102 days.  She is going through some marital changes and stress.  Her youngest child is graduating from high school this year.    She has no GYN complaints at this time except for vaginal dryness but she has not currently sexually active and does not notice this on a daily basis.      GYNECOLOGIC HISTORY:    No LMP recorded. Patient has had an ablation.    Regular menses? no    Her current contraception method is: not sexually active.  She  reports that she has never smoked. She has never used smokeless tobacco.    Patient is not sexually active.  STD testing offered?  Declined  Last PHQ-9 score on record =   PHQ-9 SCORE 2022   PHQ-9 Total Score 4     Last GAD7 score on record =   ESTELLE-7 SCORE 2022   Total Score 1     Alcohol Score = 0    HEALTH MAINTENANCE:  Cholesterol: 2022   Total= 207, Triglycerides= 59, HDL=57, NFQ=604, YJG=424, TSH (2022): 1.86  Last Mammo: One year ago, Result: Normal, Next Mammo: Today  Pap:   Lab Results   Component Value Date    PAP NIL, HPV- 2020    PAP NIL 2017    PAP NIL 2016      Colonoscopy:  2021, Result: Normal, Next Colonoscopy: 8 years.  Dexa:  N/a    Health maintenance updated:  yes    HISTORY:  OB History    Para Term  AB Living   3 2 0 0 1 2   SAB IAB Ectopic Multiple Live Births   0 1 0 0 0      # Outcome Date GA Lbr Alejandro/2nd Weight Sex Delivery Anes PTL Lv   3 IAB            2 Para            1 Para                Patient Active Problem List   Diagnosis     Sprain and strain of other specified  sites of hip and thigh     Late effect of sprain and strain without mention of tendon injury     Nonallopathic lesion of sacral region     Ankle pain     Edema     Hamstring muscle strain     Pain in shoulder     Shoulder impingement syndrome     ASCUS of cervix with negative high risk HPV     Alcohol use disorder, moderate, dependence (H)     Depression, major, single episode, mild (H)     Past Surgical History:   Procedure Laterality Date     BUNIONECTOMY  2012     GYN SURGERY  2012    Novasure ablation     REPAIR TENDON ACHILLES Left 11/11/2020     SHOULDER SURGERY Bilateral 2015    bicep tear      Social History     Tobacco Use     Smoking status: Never Smoker     Smokeless tobacco: Never Used   Substance Use Topics     Alcohol use: Not Currently     Alcohol/week: 4.2 standard drinks     Types: 5 Standard drinks or equivalent per week     Comment: quit on the 4th of April 2020      Problem (# of Occurrences) Relation (Name,Age of Onset)    Diabetes (1) Mother    Heart Surgery (1) Father    No Known Problems (6) Sister, Brother, Maternal Grandmother, Maternal Grandfather, Paternal Grandmother, Other            Current Outpatient Medications   Medication Sig     acamprosate (CAMPRAL) 333 MG EC tablet 3 tab twice daily     acyclovir (ZOVIRAX) 400 MG tablet TAKE 1 TABLET BY MOUTH TWICE DAILY FOR 5 DAYS AS NEEDED DURING AN OUTBREAK     buPROPion (WELLBUTRIN XL) 150 MG 24 hr tablet Take 150 mg by mouth     Cetirizine-Pseudoephedrine (ZYRTEC-D PO) Take 1 tablet by mouth daily     Cholecalciferol (VITAMIN D3 PO) Take 2,000 Units by mouth daily     disulfiram (ANTABUSE) 250 MG tablet Take 250 mg by mouth     escitalopram (LEXAPRO) 20 MG tablet Take 20 mg by mouth daily     metFORMIN (GLUCOPHAGE-XR) 500 MG 24 hr tablet Take 500 mg by mouth     traZODone (DESYREL) 50 MG tablet Take 50 mg by mouth At Bedtime     No current facility-administered medications for this visit.     No Known Allergies    Past medical, surgical,  "social and family histories were reviewed and updated in EPIC.    ROS:   12 point review of systems negative other than symptoms noted below or in the HPI.  No urinary frequency or dysuria, bladder or kidney problems    EXAM:  /62   Pulse 82   Ht 1.657 m (5' 5.25\")   Breastfeeding No   BMI 29.72 kg/m     BMI: Body mass index is 29.72 kg/m .    PHYSICAL EXAM:  Constitutional:   Appearance: Well nourished, well developed, alert, in no acute distress  Neck:  Lymph Nodes:  No lymphadenopathy present    Thyroid:  Gland size normal, nontender, no nodules or masses present  on palpation  Chest:  Respiratory Effort:  Breathing unlabored  Cardiovascular:    Heart: Auscultation:  Regular rate, normal rhythm, no murmurs present  Breasts: Inspection of Breasts:  No lymphadenopathy present., Palpation of Breasts and Axillae:  No masses present on palpation, no breast tenderness., Axillary Lymph Nodes:  No lymphadenopathy present. and No nodularity, asymmetry or nipple discharge bilaterally.  Gastrointestinal:   Abdominal Examination:  Abdomen TENDER to palpation LUQ/LLQ, tone normal without rigidity or guarding, no masses present, umbilicus without lesions   Liver and Spleen:  No hepatomegaly present, liver nontender to palpation    Hernias:  No hernias present  Lymphatic: Lymph Nodes:  No other lymphadenopathy present  Skin:  General Inspection:  No rashes present, no lesions present, no areas of  discoloration  Neurologic:    Mental Status:  Oriented X3.  Normal strength and tone, sensory exam                grossly normal, mentation intact and speech normal.    Psychiatric:   Mentation appears normal and affect normal/bright.         Pelvic Exam:  External Genitalia:     Normal appearance for age, no discharge present, no tenderness present, no inflammatory lesions present, color normal  Vagina:     Normal vaginal vault without central or paravaginal defects, no discharge present, no inflammatory lesions present, no " masses present  Bladder:     Nontender to palpation  Urethra:   Urethral Body:  Urethra palpation normal, urethra structural support normal   Urethral Meatus:  No erythema or lesions present  Cervix:     Appearance healthy, no lesions present, nontender to palpation, no bleeding present  Uterus:     Uterus: firm, normal sized and nontender, anteverted in position.   Adnexa:     No adnexal tenderness present, no adnexal masses present  Perineum:     Perineum within normal limits, no evidence of trauma, no rashes or skin lesions present  Anus:     Anus within normal limits, no hemorrhoids present  Inguinal Lymph Nodes:     No lymphadenopathy present  Pubic Hair:     Normal pubic hair distribution for age  Genitalia and Groin:     No rashes present, no lesions present, no areas of discoloration, no masses present      COUNSELING:   Special attention given to:        Regular exercise       Healthy diet/nutrition    BMI: Body mass index is 29.72 kg/m .  Weight management plan: Discussed healthy diet and exercise guidelines    ASSESSMENT:  52 year old female with satisfactory annual exam.    ICD-10-CM    1. Encounter for gynecological examination without abnormal finding  Z01.419    2. HSV infection  B00.9 acyclovir (ZOVIRAX) 400 MG tablet       PLAN:  52-year-old female with a benign feeling pelvis today.  We encouraged her to give the metformin medication change some time as well as her Metamucil.  All of her discomfort is centralized on the left upper and lower quadrant of the abdomen and nothing pelvic.  She does not have any vaginal atrophy on exam and we have encouraged her to use Uber lube as a sexual lubricant or coconut oil/Replens for a daily moisturizer if needed.  I also feel like this could be a component of some of her medications causing more vaginal dryness.    LETTY Diego CNP

## 2022-04-29 ENCOUNTER — ANCILLARY PROCEDURE (OUTPATIENT)
Dept: MAMMOGRAPHY | Facility: CLINIC | Age: 52
End: 2022-04-29
Payer: COMMERCIAL

## 2022-04-29 ENCOUNTER — OFFICE VISIT (OUTPATIENT)
Dept: OBGYN | Facility: CLINIC | Age: 52
End: 2022-04-29
Payer: COMMERCIAL

## 2022-04-29 VITALS
HEIGHT: 65 IN | HEART RATE: 82 BPM | DIASTOLIC BLOOD PRESSURE: 62 MMHG | SYSTOLIC BLOOD PRESSURE: 100 MMHG | BODY MASS INDEX: 29.72 KG/M2

## 2022-04-29 DIAGNOSIS — Z01.419 ENCOUNTER FOR GYNECOLOGICAL EXAMINATION WITHOUT ABNORMAL FINDING: Primary | ICD-10-CM

## 2022-04-29 DIAGNOSIS — Z23 HIGH PRIORITY FOR 2019-NCOV VACCINE: ICD-10-CM

## 2022-04-29 DIAGNOSIS — B00.9 HSV INFECTION: ICD-10-CM

## 2022-04-29 DIAGNOSIS — Z12.31 VISIT FOR SCREENING MAMMOGRAM: ICD-10-CM

## 2022-04-29 PROCEDURE — 91305 COVID-19,PF,PFIZER (12+ YRS): CPT | Performed by: NURSE PRACTITIONER

## 2022-04-29 PROCEDURE — 99396 PREV VISIT EST AGE 40-64: CPT | Mod: 25 | Performed by: NURSE PRACTITIONER

## 2022-04-29 PROCEDURE — 0054A COVID-19,PF,PFIZER (12+ YRS): CPT | Performed by: NURSE PRACTITIONER

## 2022-04-29 PROCEDURE — 77067 SCR MAMMO BI INCL CAD: CPT | Mod: TC | Performed by: RADIOLOGY

## 2022-04-29 PROCEDURE — 77063 BREAST TOMOSYNTHESIS BI: CPT | Mod: TC | Performed by: RADIOLOGY

## 2022-04-29 RX ORDER — ACYCLOVIR 400 MG/1
TABLET ORAL
Qty: 30 TABLET | Refills: 3 | Status: SHIPPED | OUTPATIENT
Start: 2022-04-29 | End: 2023-05-23

## 2022-04-29 RX ORDER — METFORMIN HCL 500 MG
500 TABLET, EXTENDED RELEASE 24 HR ORAL
COMMUNITY
Start: 2022-04-21

## 2022-04-29 RX ORDER — ACAMPROSATE CALCIUM 333 MG/1
TABLET, DELAYED RELEASE ORAL
COMMUNITY
Start: 2022-04-21

## 2022-04-29 RX ORDER — BUPROPION HYDROCHLORIDE 150 MG/1
150 TABLET ORAL
COMMUNITY
Start: 2022-03-22

## 2022-04-29 RX ORDER — DISULFIRAM 250 MG/1
250 TABLET ORAL
COMMUNITY
Start: 2022-03-22

## 2022-04-29 ASSESSMENT — ANXIETY QUESTIONNAIRES
6. BECOMING EASILY ANNOYED OR IRRITABLE: NOT AT ALL
IF YOU CHECKED OFF ANY PROBLEMS ON THIS QUESTIONNAIRE, HOW DIFFICULT HAVE THESE PROBLEMS MADE IT FOR YOU TO DO YOUR WORK, TAKE CARE OF THINGS AT HOME, OR GET ALONG WITH OTHER PEOPLE: NOT DIFFICULT AT ALL
2. NOT BEING ABLE TO STOP OR CONTROL WORRYING: NOT AT ALL
GAD7 TOTAL SCORE: 1
5. BEING SO RESTLESS THAT IT IS HARD TO SIT STILL: NOT AT ALL
3. WORRYING TOO MUCH ABOUT DIFFERENT THINGS: NOT AT ALL
1. FEELING NERVOUS, ANXIOUS, OR ON EDGE: SEVERAL DAYS
7. FEELING AFRAID AS IF SOMETHING AWFUL MIGHT HAPPEN: NOT AT ALL

## 2022-04-29 ASSESSMENT — PATIENT HEALTH QUESTIONNAIRE - PHQ9
SUM OF ALL RESPONSES TO PHQ QUESTIONS 1-9: 4
5. POOR APPETITE OR OVEREATING: NOT AT ALL

## 2022-04-30 ASSESSMENT — ANXIETY QUESTIONNAIRES: GAD7 TOTAL SCORE: 1

## 2022-08-09 ENCOUNTER — LAB REQUISITION (OUTPATIENT)
Dept: LAB | Facility: CLINIC | Age: 52
End: 2022-08-09
Payer: COMMERCIAL

## 2022-08-09 DIAGNOSIS — L08.9 LOCAL INFECTION OF THE SKIN AND SUBCUTANEOUS TISSUE, UNSPECIFIED: ICD-10-CM

## 2022-08-09 PROCEDURE — 87077 CULTURE AEROBIC IDENTIFY: CPT | Mod: ORL | Performed by: DERMATOLOGY

## 2022-08-09 PROCEDURE — 87529 HSV DNA AMP PROBE: CPT | Mod: ORL | Performed by: DERMATOLOGY

## 2022-08-10 LAB
HSV1 DNA SPEC QL NAA+PROBE: NOT DETECTED
HSV2 DNA SPEC QL NAA+PROBE: NOT DETECTED

## 2022-08-11 LAB — BACTERIA SPEC CULT: ABNORMAL

## 2023-05-23 ENCOUNTER — OFFICE VISIT (OUTPATIENT)
Dept: OBGYN | Facility: CLINIC | Age: 53
End: 2023-05-23
Payer: COMMERCIAL

## 2023-05-23 ENCOUNTER — ANCILLARY PROCEDURE (OUTPATIENT)
Dept: MAMMOGRAPHY | Facility: CLINIC | Age: 53
End: 2023-05-23
Payer: COMMERCIAL

## 2023-05-23 VITALS
HEIGHT: 66 IN | SYSTOLIC BLOOD PRESSURE: 110 MMHG | BODY MASS INDEX: 32.43 KG/M2 | WEIGHT: 201.8 LBS | DIASTOLIC BLOOD PRESSURE: 72 MMHG

## 2023-05-23 DIAGNOSIS — Z12.4 SCREENING FOR CERVICAL CANCER: ICD-10-CM

## 2023-05-23 DIAGNOSIS — Z01.419 ENCNTR FOR GYN EXAM (GENERAL) (ROUTINE) W/O ABN FINDINGS: Primary | ICD-10-CM

## 2023-05-23 DIAGNOSIS — B00.9 HSV INFECTION: ICD-10-CM

## 2023-05-23 DIAGNOSIS — Z12.31 VISIT FOR SCREENING MAMMOGRAM: ICD-10-CM

## 2023-05-23 PROCEDURE — 77063 BREAST TOMOSYNTHESIS BI: CPT | Mod: TC | Performed by: RADIOLOGY

## 2023-05-23 PROCEDURE — 99396 PREV VISIT EST AGE 40-64: CPT | Performed by: NURSE PRACTITIONER

## 2023-05-23 PROCEDURE — 87624 HPV HI-RISK TYP POOLED RSLT: CPT | Performed by: NURSE PRACTITIONER

## 2023-05-23 PROCEDURE — G0145 SCR C/V CYTO,THINLAYER,RESCR: HCPCS | Performed by: NURSE PRACTITIONER

## 2023-05-23 PROCEDURE — 77067 SCR MAMMO BI INCL CAD: CPT | Mod: TC | Performed by: RADIOLOGY

## 2023-05-23 RX ORDER — ACYCLOVIR 400 MG/1
TABLET ORAL
Qty: 30 TABLET | Refills: 3 | Status: SHIPPED | OUTPATIENT
Start: 2023-05-23 | End: 2024-02-05

## 2023-05-23 ASSESSMENT — ANXIETY QUESTIONNAIRES
GAD7 TOTAL SCORE: 0
2. NOT BEING ABLE TO STOP OR CONTROL WORRYING: NOT AT ALL
6. BECOMING EASILY ANNOYED OR IRRITABLE: NOT AT ALL
7. FEELING AFRAID AS IF SOMETHING AWFUL MIGHT HAPPEN: NOT AT ALL
1. FEELING NERVOUS, ANXIOUS, OR ON EDGE: NOT AT ALL
IF YOU CHECKED OFF ANY PROBLEMS ON THIS QUESTIONNAIRE, HOW DIFFICULT HAVE THESE PROBLEMS MADE IT FOR YOU TO DO YOUR WORK, TAKE CARE OF THINGS AT HOME, OR GET ALONG WITH OTHER PEOPLE: NOT DIFFICULT AT ALL
3. WORRYING TOO MUCH ABOUT DIFFERENT THINGS: NOT AT ALL
GAD7 TOTAL SCORE: 0
5. BEING SO RESTLESS THAT IT IS HARD TO SIT STILL: NOT AT ALL

## 2023-05-23 ASSESSMENT — PATIENT HEALTH QUESTIONNAIRE - PHQ9
5. POOR APPETITE OR OVEREATING: NOT AT ALL
SUM OF ALL RESPONSES TO PHQ QUESTIONS 1-9: 1

## 2023-05-23 NOTE — PROGRESS NOTES
Allyssa is a 53 year old  female who presents for annual exam.     Besides routine health maintenance, she has no other health concerns today .    HPI:  The patient's PCP is  Dolores Raphael PA-C.  Patient here today for her annual GYN exam and mammogram.  She is also due for Pap smear.  Colonoscopy is up-to-date.  She is postmenopausal.  She status post endometrial ablation and has no bleeding.  She has no hot flashes or night sweats.      GYNECOLOGIC HISTORY:    No LMP recorded (lmp unknown). Patient has had an ablation.    Regular menses? Ablation   Menses every 0 days.  Patient is not sexually active.  Her current contraception method is: ablation .  STD testing offered?  Declined    She  reports that she has never smoked. She has never used smokeless tobacco.      Last PHQ-9 score on record =       2023     1:57 PM   PHQ-9 SCORE   PHQ-9 Total Score 1     Last GAD7 score on record =       2023     1:57 PM   ESTELLE-7 SCORE   Total Score 0     Alcohol Score = 0    HEALTH MAINTENANCE:  Pap:   Lab Results   Component Value Date    PAP NIL; negHPV 2020    PAP NIL 2017    PAP NIL 2016   Care Gaps  Overdue  Never   Done ADVANCE CARE PLANNING (Every 5 Years)     Never   Done DEPRESSION ACTION PLAN (Once)     Never   Done HEPATITIS B IMMUNIZATION (1 of 3 - 3-dose series)     Never   Done Pneumococcal Vaccine: Pediatrics (0 to 5 Years) and At-Risk Patients (6 to 64 Years) (1 - PCV)     Never   Done HIV SCREENING (Once)     Never   Done HEPATITIS C SCREENING (Once)     Never   Done ZOSTER IMMUNIZATION (1 of 2)     2022 COVID-19 Vaccine (4 - Pfizer series)  Last completed: 2022   SEP 1   2022 INFLUENZA VACCINE (1)  Last completed: 2021   OCT 29   2022 PHQ-9 (Every 6 Months)  Last completed:  MAMMO SCREENING (Yearly)   Scheduled for: May 23, 2023   Upcoming  DEC 16   2023 HPV TEST (Once)  Last completed: Dec 16, 2020   DEC 16   2023 PAP (Every 3  Years)   Order placed this encounter   MAY 23   2024 YEARLY PREVENTIVE VISIT (Yearly)  Last completed: May 23, 2023   FEB 12   2025 LIPID (Every 5 Years)  Last completed: 2020   OCT 3   2029 DTAP/TDAP/TD IMMUNIZATION (3 - Td or Tdap)  Last completed: Oct 3, 2019   CHANEL 7   2031 COLORECTAL CANCER SCREENING (COLONOSCOPY - Preferred) (Every 10 Years)   Last completed: 2021           HISTORY:  OB History    Para Term  AB Living   3 2 0 0 1 2   SAB IAB Ectopic Multiple Live Births   0 1 0 0 0      # Outcome Date GA Lbr Alejandro/2nd Weight Sex Delivery Anes PTL Lv   3 IAB            2 Para            1 Para                Patient Active Problem List   Diagnosis     Sprain and strain of other specified sites of hip and thigh     Late effect of sprain and strain without mention of tendon injury     Nonallopathic lesion of sacral region     Ankle pain     Edema     Hamstring muscle strain     Pain in shoulder     Shoulder impingement syndrome     ASCUS of cervix with negative high risk HPV     Alcohol use disorder, moderate, dependence (H)     Depression, major, single episode, mild (H)     Past Surgical History:   Procedure Laterality Date     BUNIONECTOMY  2012     GYN SURGERY  2012    Novasure ablation     REPAIR TENDON ACHILLES Left 2020     SHOULDER SURGERY Bilateral 2015    bicep tear      Social History     Tobacco Use     Smoking status: Never     Smokeless tobacco: Never   Vaping Use     Vaping status: Not on file   Substance Use Topics     Alcohol use: Not Currently     Alcohol/week: 4.2 standard drinks of alcohol     Types: 5 Standard drinks or equivalent per week     Comment: quit on the       Problem (# of Occurrences) Relation (Name,Age of Onset)    Diabetes (1) Mother    Heart Surgery (1) Father    No Known Problems (6) Sister, Brother, Maternal Grandmother, Maternal Grandfather, Paternal Grandmother, Other            Current Outpatient Medications   Medication Sig  "    acyclovir (ZOVIRAX) 400 MG tablet TAKE 1 TABLET BY MOUTH TWICE DAILY FOR 5 DAYS AS NEEDED DURING AN OUTBREAK     buPROPion (WELLBUTRIN XL) 150 MG 24 hr tablet Take 150 mg by mouth     Cetirizine-Pseudoephedrine (ZYRTEC-D PO) Take 1 tablet by mouth daily     Cholecalciferol (VITAMIN D3 PO) Take 2,000 Units by mouth daily     disulfiram (ANTABUSE) 250 MG tablet Take 250 mg by mouth     escitalopram (LEXAPRO) 20 MG tablet Take 20 mg by mouth daily     acamprosate (CAMPRAL) 333 MG EC tablet 3 tab twice daily (Patient not taking: Reported on 5/23/2023)     metFORMIN (GLUCOPHAGE-XR) 500 MG 24 hr tablet Take 500 mg by mouth (Patient not taking: Reported on 5/23/2023)     traZODone (DESYREL) 50 MG tablet Take 50 mg by mouth At Bedtime     No current facility-administered medications for this visit.     No Known Allergies    Past medical, surgical, social and family histories were reviewed and updated in EPIC.    ROS:   12 point review of systems negative other than symptoms noted below or in the HPI.  No urinary frequency or dysuria, bladder or kidney problems    EXAM:  /72   Ht 1.664 m (5' 5.5\")   Wt 91.5 kg (201 lb 12.8 oz)   LMP  (LMP Unknown)   BMI 33.07 kg/m     BMI: Body mass index is 33.07 kg/m .    PHYSICAL EXAM:  Constitutional:   Appearance: Well nourished, well developed, alert, in no acute distress  Breasts: Inspection of Breasts:  No lymphadenopathy present., Palpation of Breasts and Axillae:  No masses present on palpation, no breast tenderness., Axillary Lymph Nodes:  No lymphadenopathy present. and No nodularity, asymmetry or nipple discharge bilaterally.  Skin:  General Inspection:  No rashes present, no lesions present, no areas of  discoloration  Neurologic:    Mental Status:  Oriented X3.  Normal strength and tone, sensory exam                grossly normal, mentation intact and speech normal.    Psychiatric:   Mentation appears normal and affect normal/bright.         Pelvic " Exam:  External Genitalia:     Normal appearance for age, no discharge present, no tenderness present, no inflammatory lesions present, color normal.  Patient states that she has some irritation on the lower left labia.  No skin changes noted.  Vagina:     Normal vaginal vault without central or paravaginal defects, no discharge present, no inflammatory lesions present, no masses present  Bladder:     Nontender to palpation  Urethra:   Urethral Body:  Urethra palpation normal, urethra structural support normal   Urethral Meatus:  No erythema or lesions present  Cervix:     Appearance healthy, no lesions present, nontender to palpation, no bleeding present  Uterus:     Uterus: firm, normal sized and nontender, anteverted in position.   Adnexa:     No adnexal tenderness present, no adnexal masses present  Perineum:     Perineum within normal limits, no evidence of trauma, no rashes or skin lesions present  Anus:     Anus within normal limits, no hemorrhoids present  Inguinal Lymph Nodes:     No lymphadenopathy present  Pubic Hair:     Normal pubic hair distribution for age  Genitalia and Groin:     No rashes present, no lesions present, no areas of discoloration, no masses present      COUNSELING:   Special attention given to:        Regular exercise       Healthy diet/nutrition       Osteoporosis prevention/bone health       Colorectal Cancer Screening       (Eileen)menopause management    BMI: Body mass index is 33.07 kg/m .  Weight management plan: Discussed healthy diet and exercise guidelines    ASSESSMENT:  53 year old female with satisfactory annual exam.    ICD-10-CM    1. Encntr for gyn exam (general) (routine) w/o abn findings  Z01.419       2. Screening for cervical cancer  Z12.4 Pap thin layer screen with HPV - recommended age 30 - 65 years      3. HSV infection  B00.9 acyclovir (ZOVIRAX) 400 MG tablet          PLAN:  53-year-old postmenopausal female with a normal GYN exam.  She is to continue with annual  mammograms.  Pap smear was collected and if it is normal she can repeat in 3 years.    LETTY Diego CNP

## 2023-05-26 LAB
BKR LAB AP GYN ADEQUACY: NORMAL
BKR LAB AP GYN INTERPRETATION: NORMAL
BKR LAB AP HPV REFLEX: NORMAL
BKR LAB AP PREVIOUS ABNORMAL: NORMAL
PATH REPORT.COMMENTS IMP SPEC: NORMAL
PATH REPORT.COMMENTS IMP SPEC: NORMAL
PATH REPORT.RELEVANT HX SPEC: NORMAL

## 2023-05-30 LAB
HUMAN PAPILLOMA VIRUS 16 DNA: NEGATIVE
HUMAN PAPILLOMA VIRUS 18 DNA: NEGATIVE
HUMAN PAPILLOMA VIRUS FINAL DIAGNOSIS: NORMAL
HUMAN PAPILLOMA VIRUS OTHER HR: NEGATIVE

## 2024-02-05 DIAGNOSIS — B00.9 HSV INFECTION: ICD-10-CM

## 2024-02-05 RX ORDER — ACYCLOVIR 400 MG/1
TABLET ORAL
Qty: 30 TABLET | Refills: 1 | Status: SHIPPED | OUTPATIENT
Start: 2024-02-05

## 2024-02-05 NOTE — TELEPHONE ENCOUNTER
Requested Prescriptions   Pending Prescriptions Disp Refills    acyclovir (ZOVIRAX) 400 MG tablet [Pharmacy Med Name: ACYCLOVIR 400MG TABLETS] 30 tablet 3     Sig: TAKE 1 TABLET BY MOUTH TWICE DAILY DURING OUTBREAK FOR 5 DAYS AS NEEDED       Antivirals for Herpes Protocol Failed - 2/5/2024 10:53 AM        Failed - Normal serum creatinine on file in past 12 months     Recent Labs   Lab Test 04/18/20  1412   CR 0.84       Ok to refill medication if creatinine is low          Passed - Patient is age 12 or older        Passed - Recent (12 mo) or future (30 days) visit within the authorizing provider's specialty     The patient must have completed an in-person or virtual visit within the past 12 months or has a future visit scheduled within the next 90 days with the authorizing provider s specialty.  Urgent care and e-visits do not quality as an office visit for this protocol.          Passed - Medication is active on med list           Last Written Prescription Date:  5/23/23  Last Fill Quantity: 30,  # refills: 3   Last office visit: 5/23/2023 ; last virtual visit: Visit date not found with prescribing provider:  Lidia King NP   Future Office Visit:      Prescription approved per [facility] Refill Protocol.  Lidia Melendez RN on 2/5/2024 at 4:39 PM

## 2024-06-12 ENCOUNTER — HOSPITAL ENCOUNTER (OUTPATIENT)
Dept: MAMMOGRAPHY | Facility: CLINIC | Age: 54
Discharge: HOME | End: 2024-06-12
Admitting: PHYSICIAN ASSISTANT

## 2024-06-12 DIAGNOSIS — Z12.31 VISIT FOR SCREENING MAMMOGRAM: ICD-10-CM

## 2024-06-12 PROCEDURE — 77063 BREAST TOMOSYNTHESIS BI: CPT

## 2025-06-17 ENCOUNTER — HOSPITAL ENCOUNTER (OUTPATIENT)
Dept: MAMMOGRAPHY | Facility: CLINIC | Age: 55
Discharge: HOME | End: 2025-06-17
Attending: PHYSICIAN ASSISTANT
Payer: COMMERCIAL

## 2025-06-17 DIAGNOSIS — Z12.31 VISIT FOR SCREENING MAMMOGRAM: ICD-10-CM

## 2025-06-17 PROCEDURE — 77063 BREAST TOMOSYNTHESIS BI: CPT
